# Patient Record
Sex: FEMALE | Race: BLACK OR AFRICAN AMERICAN | NOT HISPANIC OR LATINO | Employment: OTHER | URBAN - METROPOLITAN AREA
[De-identification: names, ages, dates, MRNs, and addresses within clinical notes are randomized per-mention and may not be internally consistent; named-entity substitution may affect disease eponyms.]

---

## 2018-03-19 ENCOUNTER — OFFICE VISIT (OUTPATIENT)
Dept: MEDICAL GROUP | Facility: PHYSICIAN GROUP | Age: 68
End: 2018-03-19
Payer: MEDICARE

## 2018-03-19 VITALS
OXYGEN SATURATION: 98 % | TEMPERATURE: 98.3 F | BODY MASS INDEX: 32.95 KG/M2 | DIASTOLIC BLOOD PRESSURE: 80 MMHG | SYSTOLIC BLOOD PRESSURE: 120 MMHG | WEIGHT: 205 LBS | HEIGHT: 66 IN | HEART RATE: 82 BPM | RESPIRATION RATE: 14 BRPM

## 2018-03-19 DIAGNOSIS — J45.20 MILD INTERMITTENT ASTHMA WITHOUT COMPLICATION: ICD-10-CM

## 2018-03-19 DIAGNOSIS — I10 BENIGN ESSENTIAL HTN: ICD-10-CM

## 2018-03-19 PROCEDURE — 99203 OFFICE O/P NEW LOW 30 MIN: CPT | Performed by: FAMILY MEDICINE

## 2018-03-19 RX ORDER — HYDROCHLOROTHIAZIDE 25 MG/1
25 TABLET ORAL DAILY
COMMUNITY
End: 2018-03-19 | Stop reason: SDUPTHER

## 2018-03-19 RX ORDER — MULTIVIT WITH MINERALS/LUTEIN
1 TABLET ORAL DAILY
COMMUNITY
End: 2020-06-16

## 2018-03-19 RX ORDER — HYDROCHLOROTHIAZIDE 25 MG/1
25 TABLET ORAL DAILY
Qty: 90 TAB | Refills: 1 | Status: SHIPPED | OUTPATIENT
Start: 2018-03-19 | End: 2018-09-06 | Stop reason: SDUPTHER

## 2018-03-19 ASSESSMENT — ENCOUNTER SYMPTOMS
GASTROINTESTINAL NEGATIVE: 1
PSYCHIATRIC NEGATIVE: 1
HEADACHES: 0
EYES NEGATIVE: 1
HEMOPTYSIS: 0
CHILLS: 0
RESPIRATORY NEGATIVE: 1
MUSCULOSKELETAL NEGATIVE: 1
MYALGIAS: 0
CARDIOVASCULAR NEGATIVE: 1
NECK PAIN: 0
CONSTITUTIONAL NEGATIVE: 1
NEUROLOGICAL NEGATIVE: 1
COUGH: 0
FEVER: 0
DIZZINESS: 0
CONSTIPATION: 0
PALPITATIONS: 0

## 2018-03-19 ASSESSMENT — PATIENT HEALTH QUESTIONNAIRE - PHQ9: CLINICAL INTERPRETATION OF PHQ2 SCORE: 0

## 2018-03-19 NOTE — LETTER
[facility]  Luiz Perry M.D.  [address]  [facility] [address]  Fax: [phone]   Authorization for Release/Disclosure of   Protected Health Information   Name: MICHELL TURCIOS : 1950 SSN: [id number]   Address: [address] [address] Phone:    [phone] (home)    I authorize the entity listed below to release/disclose the PHI below to:   [facility]/Luiz Perry M.D. and Luiz Perry M.D.   Provider or Entity Name:     Address   City, State, [facility]   Phone:      Fax:     Reason for request: continuity of care   Information to be released:    [  ] LAST COLONOSCOPY,  including any PATH REPORT and follow-up  [  ] LAST FIT/COLOGUARD RESULT [  ] LAST DEXA  [  ] LAST MAMMOGRAM  [  ] LAST PAP  [  ] LAST LABS [  ] RETINA EXAM REPORT  [  ] IMMUNIZATION RECORDS  [  ] Release all info      [  ] Check here and initial the line next to each item to release ALL health information INCLUDING  _____ Care and treatment for drug and / or alcohol abuse  _____ HIV testing, infection status, or AIDS  _____ Genetic Testing    DATES OF SERVICE OR TIME PERIOD TO BE DISCLOSED: _____________  I understand and acknowledge that:  * This Authorization may be revoked at any time by you in writing, except if your health information has already been used or disclosed.  * Your health information that will be used or disclosed as a result of you signing this authorization could be re-disclosed by the recipient. If this occurs, your re-disclosed health information may no longer be protected by State or Federal laws.  * You may refuse to sign this Authorization. Your refusal will not affect your ability to obtain treatment.  * This Authorization becomes effective upon signing and will  on (date) __________.      If no date is indicated, this Authorization will  one (1) year from the signature date.    Name: Michell Turcios    Signature:   Date:     3/19/2018       PLEASE FAX REQUESTED RECORDS  BACK TO: [phone]

## 2018-03-19 NOTE — PROGRESS NOTES
Subjective:      Michell Turcios is a 67 y.o. female who presents with Asthma            New patient visit, just moved here from [place]  Well controlled htn and asthma  utd on  and will bring in records for us to update  Refills done today    1. Benign essential HTN  Currently treated for HTN, taking meds with no CP or sob, monitors bp at home periodically. controlled    - hydroCHLOROthiazide (HYDRODIURIL) 25 MG Tab; Take 1 Tab by mouth every day.  Dispense: 90 Tab; Refill: 1    2. Mild intermittent asthma without complication  The patient's current medical issue is well controlled on the current therapy with no new symptoms or worsening    - Fluticasone Furoate-Vilanterol (BREO ELLIPTA) 200-25 MCG/INH AEROSOL POWDER, BREATH ACTIVATED; Inhale 1 Puff by mouth every day.  Dispense: 1 Each; Refill: 6    Past Medical History:  No date: Asthma  No date: Hypertension  Past Surgical History:  No date: ATHROPLASTY      Comment: hip r  Smoking status: Former Smoker                                                              Packs/day: 0.00      Years: 0.00         Types: Cigarettes     Quit date: 3/19/2015  Smokeless tobacco: Never Used                      Alcohol use: Yes              Comment: occ     History reviewed.  No pertinent family history.      Current Outpatient Prescriptions: •  vitamin E (VITAMIN E) 1000 UNIT Cap, Take 1 Cap by mouth every day., Disp: , Rfl: •  Fluticasone Furoate-Vilanterol (BREO ELLIPTA) 200-25 MCG/INH AEROSOL POWDER, BREATH ACTIVATED, Inhale 1 Puff by mouth every day., Disp: 1 Each, Rfl: 6•  hydroCHLOROthiazide (HYDRODIURIL) 25 MG Tab, Take 1 Tab by mouth every day., Disp: 90 Tab, Rfl: 1    Patient was instructed on the use of medications, either prescriptions or OTC and informed on when the appropriate follow up time period should be. In addition, patient was also instructed that should any acute worsening occur that they should notify this clinic asap or call 911.            Review of Systems  "  Constitutional: Negative.  Negative for chills and fever.        Past Medical History:  No date: Asthma  No date: Hypertension  Past Surgical History:  No date: ATHROPLASTY      Comment: hip r  Smoking status: Former Smoker                                                              Packs/day: 0.00      Years: 0.00         Types: Cigarettes     Quit date: 3/19/2015  Smokeless tobacco: Never Used                      Alcohol use: Yes              Comment: occ     History reviewed.  No pertinent family history.     HENT: Negative.    Eyes: Negative.    Respiratory: Negative.  Negative for cough and hemoptysis.    Cardiovascular: Negative.  Negative for chest pain and palpitations.   Gastrointestinal: Negative.  Negative for constipation.   Genitourinary: Negative.  Negative for dysuria and urgency.   Musculoskeletal: Negative.  Negative for myalgias and neck pain.   Skin: Negative.  Negative for rash.   Neurological: Negative.  Negative for dizziness and headaches.   Endo/Heme/Allergies: Negative.    Psychiatric/Behavioral: Negative.  Negative for suicidal ideas.          Objective:     /80   Pulse 82   Temp 36.8 °C (98.3 °F)   Resp 14   Ht 1.67 m (5' 5.75\")   Wt 93 kg (205 lb)   SpO2 98%   BMI 33.34 kg/m²      Physical Exam   Constitutional: She is oriented to person, place, and time. She appears well-developed and well-nourished. No distress.   HENT:   Head: Normocephalic and atraumatic.   Eyes: Pupils are equal, round, and reactive to light.   Neck: Normal range of motion. Neck supple.   Cardiovascular: Normal rate and regular rhythm.    No murmur heard.  Pulmonary/Chest: Effort normal and breath sounds normal. No respiratory distress. She has no wheezes. She has no rales. She exhibits no tenderness.   Abdominal: Soft. Bowel sounds are normal. She exhibits no distension. There is no tenderness.   Musculoskeletal: Normal range of motion.   Neurological: She is alert and oriented to person, place, and " time. She has normal reflexes. No cranial nerve deficit.   Skin: Skin is warm and dry. She is not diaphoretic.   Psychiatric: She has a normal mood and affect. Her behavior is normal. Judgment and thought content normal.   Nursing note and vitals reviewed.              Assessment/Plan:     1. Benign essential HTN    - hydroCHLOROthiazide (HYDRODIURIL) 25 MG Tab; Take 1 Tab by mouth every day.  Dispense: 90 Tab; Refill: 1    2. Mild intermittent asthma without complication    - Fluticasone Furoate-Vilanterol (BREO ELLIPTA) 200-25 MCG/INH AEROSOL POWDER, BREATH ACTIVATED; Inhale 1 Puff by mouth every day.  Dispense: 1 Each; Refill: 6

## 2018-05-09 ENCOUNTER — TELEPHONE (OUTPATIENT)
Dept: MEDICAL GROUP | Facility: PHYSICIAN GROUP | Age: 68
End: 2018-05-09

## 2018-05-09 NOTE — TELEPHONE ENCOUNTER
Patient called left a message regarding some papers that need to be signed I was not sure about the message I called patient back no answer so I let a message asking for her to call us back to clarify what she is needing.

## 2018-05-23 ENCOUNTER — OFFICE VISIT (OUTPATIENT)
Dept: MEDICAL GROUP | Facility: PHYSICIAN GROUP | Age: 68
End: 2018-05-23
Payer: MEDICARE

## 2018-05-23 VITALS
RESPIRATION RATE: 12 BRPM | HEIGHT: 65 IN | DIASTOLIC BLOOD PRESSURE: 60 MMHG | TEMPERATURE: 97.6 F | HEART RATE: 62 BPM | WEIGHT: 213 LBS | OXYGEN SATURATION: 99 % | SYSTOLIC BLOOD PRESSURE: 122 MMHG | BODY MASS INDEX: 35.49 KG/M2

## 2018-05-23 DIAGNOSIS — Z02.71 DISABILITY EXAMINATION: ICD-10-CM

## 2018-05-23 PROCEDURE — 7101 PR PHYSICAL: Performed by: FAMILY MEDICINE

## 2018-05-23 NOTE — PROGRESS NOTES
Over 50% of this 25 minute visit was spent on counseling and coordination of care for the problem of  1. Disability examination  Forms filled out for rtc see in media    Past Medical History:   Diagnosis Date   • Asthma    • Hypertension      Past Surgical History:   Procedure Laterality Date   • ATHROPLASTY      hip r     Social History   Substance Use Topics   • Smoking status: Former Smoker     Types: Cigarettes     Quit date: 3/19/2015   • Smokeless tobacco: Never Used   • Alcohol use Yes      Comment: occ      History reviewed. No pertinent family history.      Current Outpatient Prescriptions:   •  ipratropium-albuterol (COMBIVENT RESPIMAT)  MCG/ACT Aero Soln, Inhale 1 Puff by mouth 4 times a day., Disp: , Rfl:   •  Fluticasone Furoate-Vilanterol (BREO ELLIPTA) 200-25 MCG/INH AEROSOL POWDER, BREATH ACTIVATED, Inhale 1 Puff by mouth every day., Disp: 1 Each, Rfl: 6  •  hydroCHLOROthiazide (HYDRODIURIL) 25 MG Tab, Take 1 Tab by mouth every day., Disp: 90 Tab, Rfl: 1  •  vitamin E (VITAMIN E) 1000 UNIT Cap, Take 1 Cap by mouth every day., Disp: , Rfl:     Patient was instructed on the use of medications, either prescriptions or OTC and informed on when the appropriate follow up time period should be. In addition, patient was also instructed that should any acute worsening occur that they should notify this clinic asap or call 911.

## 2018-06-18 ENCOUNTER — OFFICE VISIT (OUTPATIENT)
Dept: MEDICAL GROUP | Facility: PHYSICIAN GROUP | Age: 68
End: 2018-06-18
Payer: MEDICARE

## 2018-06-18 VITALS
HEART RATE: 70 BPM | DIASTOLIC BLOOD PRESSURE: 70 MMHG | WEIGHT: 212 LBS | SYSTOLIC BLOOD PRESSURE: 130 MMHG | TEMPERATURE: 98.3 F | BODY MASS INDEX: 35.32 KG/M2 | HEIGHT: 65 IN | OXYGEN SATURATION: 96 % | RESPIRATION RATE: 14 BRPM

## 2018-06-18 DIAGNOSIS — I10 BENIGN ESSENTIAL HTN: ICD-10-CM

## 2018-06-18 DIAGNOSIS — J30.1 ACUTE SEASONAL ALLERGIC RHINITIS DUE TO POLLEN: ICD-10-CM

## 2018-06-18 DIAGNOSIS — Z12.31 ENCOUNTER FOR SCREENING MAMMOGRAM FOR HIGH-RISK PATIENT: ICD-10-CM

## 2018-06-18 DIAGNOSIS — Z78.0 POST-MENOPAUSAL: ICD-10-CM

## 2018-06-18 DIAGNOSIS — Z00.00 WELL ADULT EXAM: ICD-10-CM

## 2018-06-18 DIAGNOSIS — E66.9 OBESITY (BMI 30-39.9): ICD-10-CM

## 2018-06-18 PROCEDURE — 99214 OFFICE O/P EST MOD 30 MIN: CPT | Performed by: FAMILY MEDICINE

## 2018-06-18 RX ORDER — TRIAMCINOLONE ACETONIDE 40 MG/ML
40 INJECTION, SUSPENSION INTRA-ARTICULAR; INTRAMUSCULAR ONCE
Status: COMPLETED | OUTPATIENT
Start: 2018-06-18 | End: 2018-06-18

## 2018-06-18 RX ORDER — CHLORAL HYDRATE 500 MG
1000 CAPSULE ORAL
COMMUNITY
End: 2020-09-22

## 2018-06-18 RX ADMIN — TRIAMCINOLONE ACETONIDE 40 MG: 40 INJECTION, SUSPENSION INTRA-ARTICULAR; INTRAMUSCULAR at 08:36

## 2018-06-18 ASSESSMENT — ENCOUNTER SYMPTOMS
FEVER: 0
GASTROINTESTINAL NEGATIVE: 1
NECK PAIN: 0
MYALGIAS: 0
PALPITATIONS: 0
NEUROLOGICAL NEGATIVE: 1
DIZZINESS: 0
HEMOPTYSIS: 0
CONSTITUTIONAL NEGATIVE: 1
WHEEZING: 1
CARDIOVASCULAR NEGATIVE: 1
HEADACHES: 0
CONSTIPATION: 0
CHILLS: 0
COUGH: 0
EYES NEGATIVE: 1
PSYCHIATRIC NEGATIVE: 1
MUSCULOSKELETAL NEGATIVE: 1

## 2018-06-18 NOTE — PROGRESS NOTES
Subjective:      Michell Moon is a 68 y.o. female who presents with Hypertension            1. Benign essential HTN  Currently treated for HTN, taking meds with no CP or sob, monitors bp at home periodically. controlled      2. Obesity (BMI 30-39.9)    - Patient identified as having weight management issue.  Appropriate orders and counseling given.    3. Post-menopausal    - DS-BONE DENSITY STUDY (DEXA)    4. Encounter for screening mammogram for high-risk patient    - MA-SCREEN MAMMO W/CAD-BILAT; Future    5. Well adult exam    - OCCULT BLOOD FECES IMMUNOASSAY (FIT); Future    6. Acute seasonal allergic rhinitis due to pollen  Stuffy nose and occasional wheeze at times, using breo but worse since allergy season started  - triamcinolone acetonide (KENALOG-40) injection 40 mg; 1 mL by Intramuscular route Once.    Past Medical History:  No date: Asthma  No date: Hypertension  Past Surgical History:  No date: ATHROPLASTY      Comment: hip r  Smoking status: Former Smoker                                                              Packs/day: 0.00      Years: 0.00         Types: Cigarettes     Quit date: 3/19/2015  Smokeless tobacco: Never Used                      Alcohol use: Yes              Comment: occ     No family history on file.      Current Outpatient Prescriptions: •  Omega-3 Fatty Acids (FISH OIL) 1000 MG Cap capsule, Take 1,000 mg by mouth 3 times a day, with meals., Disp: , Rfl: •  Garlic 10 MG Cap, Take  by mouth., Disp: , Rfl: •  ipratropium-albuterol (COMBIVENT RESPIMAT)  MCG/ACT Aero Soln, Inhale 1 Puff by mouth 4 times a day., Disp: , Rfl: •  vitamin E (VITAMIN E) 1000 UNIT Cap, Take 1 Cap by mouth every day., Disp: , Rfl: •  Fluticasone Furoate-Vilanterol (BREO ELLIPTA) 200-25 MCG/INH AEROSOL POWDER, BREATH ACTIVATED, Inhale 1 Puff by mouth every day., Disp: 1 Each, Rfl: 6•  hydroCHLOROthiazide (HYDRODIURIL) 25 MG Tab, Take 1 Tab by mouth every day., Disp: 90 Tab, Rfl: 1  Current  "Facility-Administered Medications: •  triamcinolone acetonide (KENALOG-40) injection 40 mg, 40 mg, Intramuscular, Once, Luiz Perry M.D.    Patient was instructed on the use of medications, either prescriptions or OTC and informed on when the appropriate follow up time period should be. In addition, patient was also instructed that should any acute worsening occur that they should notify this clinic asap or call 911.            Review of Systems   Constitutional: Negative.  Negative for chills and fever.        Past Medical History:  No date: Asthma  No date: Hypertension  Past Surgical History:  No date: ATHROPLASTY      Comment: hip r  Smoking status: Former Smoker                                                              Packs/day: 0.00      Years: 0.00         Types: Cigarettes     Quit date: 3/19/2015  Smokeless tobacco: Never Used                      Alcohol use: Yes              Comment: occ     No family history on file.     HENT: Positive for congestion.    Eyes: Negative.    Respiratory: Positive for wheezing. Negative for cough and hemoptysis.    Cardiovascular: Negative.  Negative for chest pain and palpitations.   Gastrointestinal: Negative.  Negative for constipation.   Genitourinary: Negative.  Negative for dysuria and urgency.   Musculoskeletal: Negative.  Negative for myalgias and neck pain.   Skin: Negative.  Negative for rash.   Neurological: Negative.  Negative for dizziness and headaches.   Endo/Heme/Allergies: Negative.    Psychiatric/Behavioral: Negative.  Negative for suicidal ideas.          Objective:     /70   Pulse 70   Temp 36.8 °C (98.3 °F)   Resp 14   Ht 1.651 m (5' 5\")   Wt 96.2 kg (212 lb)   SpO2 96%   BMI 35.28 kg/m²      Physical Exam   Constitutional: She is oriented to person, place, and time. She appears well-developed and well-nourished. No distress.   HENT:   Head: Normocephalic and atraumatic.   Mouth/Throat: Oropharynx is clear and moist. No " oropharyngeal exudate.   Eyes: Pupils are equal, round, and reactive to light.   Cardiovascular: Normal rate, regular rhythm, normal heart sounds and intact distal pulses.  Exam reveals no gallop and no friction rub.    No murmur heard.  Pulmonary/Chest: Effort normal and breath sounds normal. No respiratory distress. She has no wheezes. She has no rales. She exhibits no tenderness.   Neurological: She is alert and oriented to person, place, and time.   Skin: She is not diaphoretic.   Psychiatric: She has a normal mood and affect. Her behavior is normal. Judgment and thought content normal.   Nursing note and vitals reviewed.              Assessment/Plan:     1. Benign essential HTN      2. Obesity (BMI 30-39.9)    - Patient identified as having weight management issue.  Appropriate orders and counseling given.    3. Post-menopausal    - DS-BONE DENSITY STUDY (DEXA)    4. Encounter for screening mammogram for high-risk patient    - MA-SCREEN MAMMO W/CAD-BILAT; Future    5. Well adult exam    - OCCULT BLOOD FECES IMMUNOASSAY (FIT); Future    6. Acute seasonal allergic rhinitis due to pollen    - triamcinolone acetonide (KENALOG-40) injection 40 mg; 1 mL by Intramuscular route Once.

## 2018-07-18 ENCOUNTER — HOSPITAL ENCOUNTER (OUTPATIENT)
Dept: RADIOLOGY | Facility: MEDICAL CENTER | Age: 68
End: 2018-07-18
Attending: FAMILY MEDICINE
Payer: MEDICARE

## 2018-07-18 DIAGNOSIS — Z12.31 ENCOUNTER FOR SCREENING MAMMOGRAM FOR HIGH-RISK PATIENT: ICD-10-CM

## 2018-07-18 PROCEDURE — 77080 DXA BONE DENSITY AXIAL: CPT

## 2018-07-18 PROCEDURE — 77067 SCR MAMMO BI INCL CAD: CPT

## 2018-07-19 ENCOUNTER — TELEPHONE (OUTPATIENT)
Dept: MEDICAL GROUP | Facility: PHYSICIAN GROUP | Age: 68
End: 2018-07-19

## 2018-08-09 ENCOUNTER — OFFICE VISIT (OUTPATIENT)
Dept: URGENT CARE | Facility: CLINIC | Age: 68
End: 2018-08-09
Payer: MEDICARE

## 2018-08-09 VITALS
SYSTOLIC BLOOD PRESSURE: 138 MMHG | BODY MASS INDEX: 34.22 KG/M2 | OXYGEN SATURATION: 98 % | WEIGHT: 205.4 LBS | TEMPERATURE: 97.4 F | HEIGHT: 65 IN | HEART RATE: 100 BPM | DIASTOLIC BLOOD PRESSURE: 90 MMHG | RESPIRATION RATE: 18 BRPM

## 2018-08-09 DIAGNOSIS — J45.21 MILD INTERMITTENT ASTHMA WITH EXACERBATION: ICD-10-CM

## 2018-08-09 DIAGNOSIS — J42 CHRONIC BRONCHITIS, UNSPECIFIED CHRONIC BRONCHITIS TYPE (HCC): ICD-10-CM

## 2018-08-09 DIAGNOSIS — R06.03 RESPIRATORY DISTRESS: ICD-10-CM

## 2018-08-09 DIAGNOSIS — I10 BENIGN ESSENTIAL HTN: ICD-10-CM

## 2018-08-09 PROCEDURE — 94640 AIRWAY INHALATION TREATMENT: CPT | Performed by: FAMILY MEDICINE

## 2018-08-09 PROCEDURE — 99214 OFFICE O/P EST MOD 30 MIN: CPT | Mod: 25 | Performed by: FAMILY MEDICINE

## 2018-08-09 RX ORDER — METHYLPREDNISOLONE SODIUM SUCCINATE 125 MG/2ML
125 INJECTION, POWDER, LYOPHILIZED, FOR SOLUTION INTRAMUSCULAR; INTRAVENOUS ONCE
Status: COMPLETED | OUTPATIENT
Start: 2018-08-09 | End: 2018-08-09

## 2018-08-09 RX ORDER — METHYLPREDNISOLONE 4 MG/1
TABLET ORAL
Qty: 21 TAB | Refills: 0 | Status: SHIPPED | OUTPATIENT
Start: 2018-08-10 | End: 2018-10-29

## 2018-08-09 RX ORDER — ALBUTEROL SULFATE 90 UG/1
1-2 AEROSOL, METERED RESPIRATORY (INHALATION) EVERY 6 HOURS PRN
Qty: 1 INHALER | Refills: 0 | Status: SHIPPED | OUTPATIENT
Start: 2018-08-09 | End: 2018-10-29

## 2018-08-09 RX ORDER — IPRATROPIUM BROMIDE AND ALBUTEROL SULFATE 2.5; .5 MG/3ML; MG/3ML
3 SOLUTION RESPIRATORY (INHALATION) EVERY 6 HOURS PRN
Qty: 30 BULLET | Refills: 0 | Status: SHIPPED | OUTPATIENT
Start: 2018-08-09 | End: 2018-10-29

## 2018-08-09 RX ORDER — IPRATROPIUM BROMIDE AND ALBUTEROL SULFATE 2.5; .5 MG/3ML; MG/3ML
3 SOLUTION RESPIRATORY (INHALATION) ONCE
Status: COMPLETED | OUTPATIENT
Start: 2018-08-09 | End: 2018-08-09

## 2018-08-09 RX ADMIN — METHYLPREDNISOLONE SODIUM SUCCINATE 125 MG: 125 INJECTION, POWDER, LYOPHILIZED, FOR SOLUTION INTRAMUSCULAR; INTRAVENOUS at 18:00

## 2018-08-09 RX ADMIN — IPRATROPIUM BROMIDE AND ALBUTEROL SULFATE 3 ML: 2.5; .5 SOLUTION RESPIRATORY (INHALATION) at 18:00

## 2018-08-10 NOTE — PROGRESS NOTES
Subjective:      Michell Moon is a 68 y.o. female who presents with Asthma (cough, sob, sweats and chills, today has been the worse )    Patient presents to  with her daughter in respiratory distress. This is a new problmem onset over past 36hrs. She is speaking in 1-2 words audibly wheezing and choking on mucus as she coughs. Immediately given a duoneb tx and solumedrol 125mg.     Within several minutes of the treatment she is feeling much improved. She states that she has a h/o ashtma, daughter adds copd as well. She has recenlty been started on a med for COPD inhaled but with the smoke in the air she has had progressive problems with breathing. Denies any fevers has had chills today. She denies chest pain palpitations feeling lightheaded or faint. She denies feelilng ill recently no discolored mucus when she coughs, no nasal congestion or sinus pain no ear pain or sore throat no n,vd\\        HPI  Review of Systems   Constitutional: Negative for malaise/fatigue.   Eyes: Negative for discharge.   Respiratory: Positive for stridor. Negative for hemoptysis.    Cardiovascular: Negative for palpitations.   Musculoskeletal: Negative.    Skin: Negative for rash.   Neurological: Positive for headaches. Negative for weakness.   All other systems reviewed and are negative.    PMH:  has a past medical history of Asthma and Hypertension.  MEDS:   Current Outpatient Prescriptions:   •  Hydrocod Polst-CPM Polst ER (TUSSIONEX) 10-8 MG/5ML Suspension Extended Release, Take 5 mL by mouth 2 times a day as needed for Cough or Rhinitis for up to 6 days. Will cause sedation, avoid driving, operating heavy machinery, and drinking alcohol, Disp: 60 mL, Rfl: 0  •  ipratropium-albuterol (DUONEB) 0.5-2.5 (3) MG/3ML nebulizer solution, 3 mL by Nebulization route every 6 hours as needed for Shortness of Breath., Disp: 30 Bullet, Rfl: 0  •  [START ON 8/10/2018] MethylPREDNISolone (MEDROL DOSEPAK) 4 MG Tablet Therapy Pack, Take as directed  "with food, Disp: 21 Tab, Rfl: 0  •  albuterol 108 (90 Base) MCG/ACT Aero Soln inhalation aerosol, Inhale 1-2 Puffs by mouth every 6 hours as needed for Shortness of Breath., Disp: 1 Inhaler, Rfl: 0  •  Omega-3 Fatty Acids (FISH OIL) 1000 MG Cap capsule, Take 1,000 mg by mouth 3 times a day, with meals., Disp: , Rfl:   •  Garlic 10 MG Cap, Take  by mouth., Disp: , Rfl:   •  ipratropium-albuterol (COMBIVENT RESPIMAT)  MCG/ACT Aero Soln, Inhale 1 Puff by mouth 4 times a day., Disp: , Rfl:   •  vitamin E (VITAMIN E) 1000 UNIT Cap, Take 1 Cap by mouth every day., Disp: , Rfl:   •  Fluticasone Furoate-Vilanterol (BREO ELLIPTA) 200-25 MCG/INH AEROSOL POWDER, BREATH ACTIVATED, Inhale 1 Puff by mouth every day., Disp: 1 Each, Rfl: 6  •  hydroCHLOROthiazide (HYDRODIURIL) 25 MG Tab, Take 1 Tab by mouth every day., Disp: 90 Tab, Rfl: 1    Current Facility-Administered Medications:   •  ipratropium-albuterol (DUONEB) nebulizer solution, 3 mL, Nebulization, Once, Laureen Armenta D.O.  •  methylPREDNISolone sod succ (SOLU-MEDROL) 125 MG injection 125 mg, 125 mg, Intramuscular, Once, Laureen Armenta D.O.  ALLERGIES:   Allergies   Allergen Reactions   • Atorvastatin Vomiting   SURGHX:   Past Surgical History:   Procedure Laterality Date   • ATHROPLASTY      hip r   SOCHX:  reports that she quit smoking about 3 years ago. Her smoking use included Cigarettes. She has never used smokeless tobacco. She reports that she drinks alcohol. She reports that she does not use drugs.  FH: Family history was reviewed, no pertinent findings to report     Objective:     /90   Pulse 100   Temp 36.3 °C (97.4 °F)   Resp 18   Ht 1.651 m (5' 5\")   Wt 93.2 kg (205 lb 6.4 oz)   SpO2 98%   Breastfeeding? No   BMI 34.18 kg/m²      Physical Exam   Constitutional: She is oriented to person, place, and time. She appears well-developed and well-nourished. She appears distressed.   HENT:   Mouth/Throat: Oropharynx is clear and moist. "   Eyes: Conjunctivae are normal.   Neck: Normal range of motion.   Cardiovascular: Normal rate, regular rhythm, normal heart sounds and intact distal pulses.  Exam reveals no gallop and no friction rub.    No murmur heard.  Pulmonary/Chest: She is in respiratory distress. She has wheezes. She has rales. She exhibits no tenderness.   Abdominal: Soft.   Musculoskeletal: Normal range of motion.   Lymphadenopathy:     She has no cervical adenopathy.   Neurological: She is alert and oriented to person, place, and time.   Skin: Skin is warm and dry. She is not diaphoretic.       Therapeutics: soumedrol 125mg IM, duoneb after which pt much better, air excursion improved, she felt better, pulse ox stayed stable    Assessment/Plan:     1. Respiratory distress  ipratropium-albuterol (DUONEB) nebulizer solution    methylPREDNISolone sod succ (SOLU-MEDROL) 125 MG injection 125 mg    Hydrocod Polst-CPM Polst ER (TUSSIONEX) 10-8 MG/5ML Suspension Extended Release    ipratropium-albuterol (DUONEB) 0.5-2.5 (3) MG/3ML nebulizer solution    MethylPREDNISolone (MEDROL DOSEPAK) 4 MG Tablet Therapy Pack    albuterol 108 (90 Base) MCG/ACT Aero Soln inhalation aerosol    REFERRAL TO PULMONOLOGY   2. Benign essential HTN     3. Mild intermittent asthma with exacerbation  ipratropium-albuterol (DUONEB) nebulizer solution    methylPREDNISolone sod succ (SOLU-MEDROL) 125 MG injection 125 mg    Hydrocod Polst-CPM Polst ER (TUSSIONEX) 10-8 MG/5ML Suspension Extended Release    ipratropium-albuterol (DUONEB) 0.5-2.5 (3) MG/3ML nebulizer solution    MethylPREDNISolone (MEDROL DOSEPAK) 4 MG Tablet Therapy Pack    albuterol 108 (90 Base) MCG/ACT Aero Soln inhalation aerosol    REFERRAL TO PULMONOLOGY   4. Chronic bronchitis, unspecified chronic bronchitis type (HCC)  ipratropium-albuterol (DUONEB) 0.5-2.5 (3) MG/3ML nebulizer solution    MethylPREDNISolone (MEDROL DOSEPAK) 4 MG Tablet Therapy Pack    albuterol 108 (90 Base) MCG/ACT Aero Soln  inhalation aerosol    REFERRAL TO PULMONOLOGY     Chronic conditions of hypertension that may potentially impact the patient's ability to recover from this condition appear fair. The patient will f/u with their pcp and continue with current chronic medications as directed.    Differential diagnosis, natural history, supportive care discussed. Follow-up with primary care provider within 7-10 days, emergency room precautions discussed.  Patient and/or family appears understanding of information.

## 2018-08-11 ASSESSMENT — ENCOUNTER SYMPTOMS
HEADACHES: 1
MUSCULOSKELETAL NEGATIVE: 1
EYE DISCHARGE: 0
PALPITATIONS: 0
HEMOPTYSIS: 0
STRIDOR: 1
WEAKNESS: 0

## 2018-08-13 ENCOUNTER — OFFICE VISIT (OUTPATIENT)
Dept: PULMONOLOGY | Facility: HOSPICE | Age: 68
End: 2018-08-13
Payer: MEDICARE

## 2018-08-13 VITALS
DIASTOLIC BLOOD PRESSURE: 76 MMHG | RESPIRATION RATE: 16 BRPM | BODY MASS INDEX: 34.89 KG/M2 | OXYGEN SATURATION: 95 % | WEIGHT: 204.4 LBS | HEIGHT: 64 IN | SYSTOLIC BLOOD PRESSURE: 122 MMHG | HEART RATE: 87 BPM | TEMPERATURE: 97.2 F

## 2018-08-13 DIAGNOSIS — J45.41 MODERATE PERSISTENT ASTHMA WITH ACUTE EXACERBATION: ICD-10-CM

## 2018-08-13 PROCEDURE — 99204 OFFICE O/P NEW MOD 45 MIN: CPT | Performed by: INTERNAL MEDICINE

## 2018-08-13 RX ORDER — TRIAMCINOLONE ACETONIDE 40 MG/ML
40 INJECTION, SUSPENSION INTRA-ARTICULAR; INTRAMUSCULAR ONCE
Status: COMPLETED | OUTPATIENT
Start: 2018-08-13 | End: 2018-08-13

## 2018-08-13 RX ORDER — ALBUTEROL SULFATE 90 UG/1
2 AEROSOL, METERED RESPIRATORY (INHALATION) EVERY 6 HOURS PRN
COMMUNITY
End: 2018-10-29

## 2018-08-13 RX ORDER — CETIRIZINE HYDROCHLORIDE 10 MG/1
10 TABLET ORAL DAILY
COMMUNITY
End: 2020-09-22

## 2018-08-13 RX ADMIN — TRIAMCINOLONE ACETONIDE 40 MG: 40 INJECTION, SUSPENSION INTRA-ARTICULAR; INTRAMUSCULAR at 09:37

## 2018-08-13 NOTE — PROGRESS NOTES
"Chief Complaint   Patient presents with   • Establish Care     Referred by Laureen Armenta DO for Respiratory distress,Chronic Bronchitis       HPI: This patient is a 68 y.o. Female who is referred for asthma.  She is diagnosed with asthma in childhood, had followed [place] pulmonology [place] the past, [place] state.  She moved to [place] from [facility] in November 2017.  She had an asthma exacerbation in March 2018 which she attributed to an inversion layer.  Over the past week she has had exacerbation secondary to environmental smoke.  She was seen in urgent care on August 9, 2018 and provided steroids and discharged on Medrol dose pack. She is compliant with Breo 200ug QD.  She has been requiring her THANG multiple times daily.  She acquired a new cat in March 2018 although states she has always had cats in the household.  She is a non-smoker, had smoked socially only in the past.  Denies secondhand smoke exposures.  Her asthma triggers are principally seasonal allergies, URIs or \"change in seasons\".      Past Medical History:   Diagnosis Date   • Asthma    • Bronchitis    • Chickenpox    • COPD (chronic obstructive pulmonary disease) (HCC)    • Hypertension    • Mumps    • Osteoporosis    • Pulmonary emphysema (HCC)        Social History     Social History   • Marital status: Single     Spouse name: N/A   • Number of children: N/A   • Years of education: N/A     Occupational History   • Not on file.     Social History Main Topics   • Smoking status: Former Smoker     Packs/day: 0.25     Years: 4.00     Types: Cigarettes     Quit date: 3/1/2011   • Smokeless tobacco: Never Used   • Alcohol use Yes      Comment: occasional   • Drug use: No   • Sexual activity: Not Currently     Partners: Male     Other Topics Concern   • Not on file     Social History Narrative   • No narrative on file       Family History   Problem Relation Age of Onset   • Prostate cancer Father        Current Outpatient Prescriptions on File " Prior to Visit   Medication Sig Dispense Refill   • Hydrocod Polst-CPM Polst ER (TUSSIONEX) 10-8 MG/5ML Suspension Extended Release Take 5 mL by mouth 2 times a day as needed for Cough or Rhinitis for up to 6 days. Will cause sedation, avoid driving, operating heavy machinery, and drinking alcohol 60 mL 0   • ipratropium-albuterol (DUONEB) 0.5-2.5 (3) MG/3ML nebulizer solution 3 mL by Nebulization route every 6 hours as needed for Shortness of Breath. 30 Bullet 0   • Omega-3 Fatty Acids (FISH OIL) 1000 MG Cap capsule Take 1,000 mg by mouth 3 times a day, with meals.     • Garlic 10 MG Cap Take  by mouth.     • ipratropium-albuterol (COMBIVENT RESPIMAT)  MCG/ACT Aero Soln Inhale 1 Puff by mouth 4 times a day.     • vitamin E (VITAMIN E) 1000 UNIT Cap Take 1 Cap by mouth every day.     • Fluticasone Furoate-Vilanterol (BREO ELLIPTA) 200-25 MCG/INH AEROSOL POWDER, BREATH ACTIVATED Inhale 1 Puff by mouth every day. 1 Each 6   • hydroCHLOROthiazide (HYDRODIURIL) 25 MG Tab Take 1 Tab by mouth every day. 90 Tab 1   • MethylPREDNISolone (MEDROL DOSEPAK) 4 MG Tablet Therapy Pack Take as directed with food 21 Tab 0   • albuterol 108 (90 Base) MCG/ACT Aero Soln inhalation aerosol Inhale 1-2 Puffs by mouth every 6 hours as needed for Shortness of Breath. 1 Inhaler 0     No current facility-administered medications on file prior to visit.        Allergies: Pollen extract and Atorvastatin    ROS:   Constitutional: Denies fevers, chills, night sweats, fatigue or weight loss  Eyes: Denies vision loss, pain, drainage, double vision  Ears, Nose, Throat: Denies earache, difficulty hearing, tinnitus, nasal congestion, hoarseness  Cardiovascular: Denies chest pain, tightness, palpitations, orthopnea or edema  Respiratory: +shortness of breath, cough, wheezing, denies hemoptysis  Sleep: Denies daytime sleepiness, snoring, apneas, insomnia, morning headaches  GI: Denies heartburn, dysphagia, nausea, abdominal pain, diarrhea or  "constipation  : Denies frequent urination, hematuria, discharge or painful urination  Musculoskeletal: Denies back pain, painful joints, sore muscles  Neurological: Denies weakness or headaches  Skin: No rashes    Blood pressure 122/76, pulse 87, temperature 36.2 °C (97.2 °F), resp. rate 16, height 1.626 m (5' 4\"), weight 92.7 kg (204 lb 6.4 oz), SpO2 95 %.    Physical Exam:  Appearance: Well-nourished, well-developed, in no acute distress  HEENT: Normocephalic, atraumatic, white sclera, PERRLA, oropharynx clear  Neck: No adenopathy or masses  Respiratory: no intercostal retractions or accessory muscle use  Lungs auscultation: Diminished breath sounds, diffuse wheezing  Cardiovascular: Regular rate rhythm. No murmurs, rubs or gallops.  No LE edema  Abdomen: soft, nondistended  Gait: Normal  Digits: No clubbing, cyanosis  Motor: No focal deficits  Orientation: Oriented to time, person and place    Diagnosis:  1. Moderate persistent asthma with acute exacerbation  triamcinolone acetonide (KENALOG-40) injection 40 mg    ALLERGY ZONE 15    IMMUNOGLOBULIN E, TOTAL    EOSINOPHIL COUNT    AMB PULMONARY FUNCTION TEST/LAB    DX-CHEST-2 VIEWS    Tiotropium Bromide Monohydrate (SPIRIVA RESPIMAT) 2.5 MCG/ACT Aero Soln       Plan:  The patient's asthma has exacerbated secondary to environmental smoke.  Kenalog 40 mg IM provided.  She is currently on a Medrol dose pack.  Start Spiriva Respimat 2.5ug 2 puffs QD.  Continue Breo 200ug 1 puff QD, and albuterol HFA 1-2 puffs every 4 hours as needed.  Obtain RAST (particularly to exclude cat allergy), IgE level and eosinophil count.  Obtain baseline pulmonary function testing with DLCO.  Obtain baseline two-view chest x-ray.  Return in about 4 weeks (around 9/10/2018).      "

## 2018-08-13 NOTE — LETTER
August 13, 2018      To whom it may concern,    Re: Michell Moon      Due to environmental circumstances, Mrs. Moon is experiencing a severe asthma exacerbation and is unable to work.  Please exempt her from work until August 27, 2018.        Please contact me with any questions or concerns.                Sincerely,          Joanne Lewis

## 2018-08-14 ENCOUNTER — HOSPITAL ENCOUNTER (OUTPATIENT)
Dept: LAB | Facility: MEDICAL CENTER | Age: 68
End: 2018-08-14
Attending: INTERNAL MEDICINE
Payer: MEDICARE

## 2018-08-14 LAB — EOSINOPHIL # BLD AUTO: 0.19 K/UL (ref 0–0.51)

## 2018-08-14 PROCEDURE — 36415 COLL VENOUS BLD VENIPUNCTURE: CPT

## 2018-08-14 PROCEDURE — 85004 AUTOMATED DIFF WBC COUNT: CPT

## 2018-08-14 PROCEDURE — 86003 ALLG SPEC IGE CRUDE XTRC EA: CPT | Mod: 91

## 2018-08-14 PROCEDURE — 82785 ASSAY OF IGE: CPT

## 2018-08-16 LAB
A ALTERNATA IGE QN: 5.43 KU/L
A FUMIGATUS IGE QN: 1.67 KU/L
BERMUDA GRASS IGE QN: <0.1 KU/L
BOXELDER IGE QN: 0.18 KU/L
C SPHAEROSPERMUM IGE QN: <0.1 KU/L
CAT DANDER IGE QN: 2.72 KU/L
CMN PIGWEED IGE QN: 0.29 KU/L
COMMON RAGWEED IGE QN: 0.11 KU/L
COTTONWOOD IGE QN: <0.1 KU/L
COW MILK IGE QN: 0.33 KU/L
D FARINAE IGE QN: 0.15 KU/L
D PTERONYSS IGE QN: 0.14 KU/L
DEPRECATED MISC ALLERGEN IGE RAST QL: ABNORMAL
DOG DANDER IGE QN: 0.4 KU/L
IGE SERPL-ACNC: 1658 KU/L
M RACEMOSUS IGE QN: <0.1 KU/L
MOUSE EPITH IGE QN: 0.11 KU/L
MT JUNIPER IGE QN: 0.14 KU/L
MUGWORT IGE QN: <0.1 KU/L
OLIVE POLN IGE QN: <0.1 KU/L
P NOTATUM IGE QN: <0.1 KU/L
PEANUT IGE QN: <0.1 KU/L
ROACH IGE QN: 0.12 KU/L
SALTWORT IGE QN: <0.1 KU/L
TIMOTHY IGE QN: 0.18 KU/L
WHITE ELM IGE QN: 0.25 KU/L
WHITE MULBERRY IGE QN: <0.1 KU/L
WHITE OAK IGE QN: <0.1 KU/L

## 2018-08-20 ENCOUNTER — PATIENT MESSAGE (OUTPATIENT)
Dept: MEDICAL GROUP | Facility: PHYSICIAN GROUP | Age: 68
End: 2018-08-20

## 2018-08-20 ENCOUNTER — TELEPHONE (OUTPATIENT)
Dept: MEDICAL GROUP | Facility: PHYSICIAN GROUP | Age: 68
End: 2018-08-20

## 2018-08-20 NOTE — TELEPHONE ENCOUNTER
----- Message from Michell Moon sent at 8/20/2018 11:03 AM PDT -----  Regarding: Non-Urgent Medical Question  Contact: [phone]  May my daughter Johnathon Carmona  the kit you are requesting for colorectal test again. I followed all steps as requested.      Thank you,    Michell Moon

## 2018-08-21 NOTE — TELEPHONE ENCOUNTER
----- Message from Michell Moon sent at 8/20/2018 11:03 AM PDT -----  Regarding: Non-Urgent Medical Question  Contact: [phone]  May my daughter Johnathon Carmona  the kit you are requesting for colorectal test again. I followed all steps as requested.      Thank you,    Michell Moon

## 2018-08-21 NOTE — TELEPHONE ENCOUNTER
----- Message from Michell Moon sent at 8/20/2018 11:03 AM PDT -----  Regarding: Non-Urgent Medical Question  Contact: [phone]  May my daughter Johnathon Carmona  the kit you are requesting for colorectal test again. I followed all steps as requested.      Thank you,    Michell Moon

## 2018-08-27 ENCOUNTER — HOSPITAL ENCOUNTER (OUTPATIENT)
Facility: MEDICAL CENTER | Age: 68
End: 2018-08-27
Attending: FAMILY MEDICINE
Payer: MEDICARE

## 2018-08-27 PROCEDURE — 82274 ASSAY TEST FOR BLOOD FECAL: CPT | Mod: GY

## 2018-08-31 DIAGNOSIS — Z00.00 WELL ADULT EXAM: ICD-10-CM

## 2018-08-31 LAB — AMBIGUOUS DTTM AMBI4: NORMAL

## 2018-09-01 LAB — HEMOCCULT STL QL IA: NEGATIVE

## 2018-09-06 DIAGNOSIS — I10 BENIGN ESSENTIAL HTN: ICD-10-CM

## 2018-09-06 DIAGNOSIS — J45.41 MODERATE PERSISTENT ASTHMA WITH ACUTE EXACERBATION: ICD-10-CM

## 2018-09-06 RX ORDER — HYDROCHLOROTHIAZIDE 25 MG/1
25 TABLET ORAL DAILY
Qty: 90 TAB | Refills: 1 | Status: SHIPPED | OUTPATIENT
Start: 2018-09-06 | End: 2018-09-12 | Stop reason: SDUPTHER

## 2018-09-06 NOTE — TELEPHONE ENCOUNTER
Was the patient seen in the last year in this department? Yes    Does patient have an active prescription for medications requested? Yes    Received Request Via: Pharmacy     Last visit 6/18/2018  Last labs 8/14/2018

## 2018-09-10 ENCOUNTER — NON-PROVIDER VISIT (OUTPATIENT)
Dept: PULMONOLOGY | Facility: HOSPICE | Age: 68
End: 2018-09-10
Payer: MEDICARE

## 2018-09-10 ENCOUNTER — APPOINTMENT (OUTPATIENT)
Dept: RADIOLOGY | Facility: IMAGING CENTER | Age: 68
End: 2018-09-10
Attending: INTERNAL MEDICINE
Payer: MEDICARE

## 2018-09-10 DIAGNOSIS — J45.41 MODERATE PERSISTENT ASTHMA WITH ACUTE EXACERBATION: ICD-10-CM

## 2018-09-10 PROCEDURE — 94726 PLETHYSMOGRAPHY LUNG VOLUMES: CPT | Performed by: INTERNAL MEDICINE

## 2018-09-10 PROCEDURE — 94060 EVALUATION OF WHEEZING: CPT | Performed by: INTERNAL MEDICINE

## 2018-09-10 PROCEDURE — 71046 X-RAY EXAM CHEST 2 VIEWS: CPT | Mod: TC,FY | Performed by: INTERNAL MEDICINE

## 2018-09-10 PROCEDURE — 94729 DIFFUSING CAPACITY: CPT | Performed by: INTERNAL MEDICINE

## 2018-09-10 ASSESSMENT — PULMONARY FUNCTION TESTS
FEV1/FVC_PERCENT_LLN: 64
FEV1_PERCENT_PREDICTED: 108
FEV1: 2.57
FEV1/FVC_PERCENT_PREDICTED: 113
FVC: 2.98
FEV1_LLN: 1.98
FEV1/FVC_PERCENT_CHANGE: 0
FEV1_PERCENT_PREDICTED: 108
FEV1/FVC_PERCENT_PREDICTED: 76
FEV1_PERCENT_CHANGE: 0
FVC_PERCENT_PREDICTED: 96
FEV1/FVC_PERCENT_PREDICTED: 111
FEV1/FVC: 86
FEV1/FVC_PERCENT_PREDICTED: 112
FEV1/FVC_PERCENT_PREDICTED: 114
FVC_PERCENT_PREDICTED: 95
FEV1/FVC_PREDICTED: 77
FEV1_PREDICTED: 2.37
FEV1/FVC_PERCENT_CHANGE: 0
FVC_LLN: 2.60
FVC: 3.01
FEV1/FVC: 86
FEV1_PERCENT_CHANGE: 1
FEV1/FVC: 86
FVC_PREDICTED: 3.11
FEV1/FVC: 85.71
FEV1: 2.58

## 2018-09-10 NOTE — PROCEDURES
Good patient effort & cooperation.  The results of this test meet the ATS/ERS standards for acceptability and repeatability.  Predicted equations for Spirometry are N-Carisa II, ITS for lung volumes, and Johns-[place] for DLCO.  The DLCO was uncorrected for Hgb.  A bronchodilator of Ventolin HFA- 2puffs via spacer were administered.  DLCO was performed during dilation period.    The FVC is 3.01 L or 96%, FEV1 is 2.58 L 108%, FEV1/FVC 86%.  %.  DLCO 119%.  No bronchodilator response.    Interpretation:  Normal lung capacity and diffusion capacity.

## 2018-09-12 DIAGNOSIS — I10 BENIGN ESSENTIAL HTN: ICD-10-CM

## 2018-09-12 DIAGNOSIS — J45.20 MILD INTERMITTENT ASTHMA WITHOUT COMPLICATION: ICD-10-CM

## 2018-09-12 NOTE — TELEPHONE ENCOUNTER
Was the patient seen in the last year in this department? Yes    Does patient have an active prescription for medications requested? Yes    Received Request Via: Pharmacy   Pt would like [facility]   Last visit 618/2018  Last labs 8/14/2018

## 2018-09-13 RX ORDER — HYDROCHLOROTHIAZIDE 25 MG/1
25 TABLET ORAL DAILY
Qty: 90 TAB | Refills: 1 | Status: SHIPPED | OUTPATIENT
Start: 2018-09-13 | End: 2019-08-15 | Stop reason: SDUPTHER

## 2018-09-18 ENCOUNTER — OFFICE VISIT (OUTPATIENT)
Dept: PULMONOLOGY | Facility: HOSPICE | Age: 68
End: 2018-09-18
Payer: MEDICARE

## 2018-09-18 VITALS
WEIGHT: 215 LBS | BODY MASS INDEX: 36.7 KG/M2 | HEART RATE: 70 BPM | OXYGEN SATURATION: 97 % | TEMPERATURE: 98 F | SYSTOLIC BLOOD PRESSURE: 112 MMHG | DIASTOLIC BLOOD PRESSURE: 68 MMHG | RESPIRATION RATE: 16 BRPM | HEIGHT: 64 IN

## 2018-09-18 DIAGNOSIS — E66.9 OBESITY (BMI 30-39.9): ICD-10-CM

## 2018-09-18 DIAGNOSIS — Z00.00 HEALTH CARE MAINTENANCE: ICD-10-CM

## 2018-09-18 DIAGNOSIS — J45.20 MILD INTERMITTENT ASTHMA WITHOUT COMPLICATION: ICD-10-CM

## 2018-09-18 PROCEDURE — G0008 ADMIN INFLUENZA VIRUS VAC: HCPCS | Performed by: PHYSICIAN ASSISTANT

## 2018-09-18 PROCEDURE — 99214 OFFICE O/P EST MOD 30 MIN: CPT | Mod: 25 | Performed by: PHYSICIAN ASSISTANT

## 2018-09-18 PROCEDURE — 90662 IIV NO PRSV INCREASED AG IM: CPT | Performed by: PHYSICIAN ASSISTANT

## 2018-09-18 NOTE — PROGRESS NOTES
CC    HPI:  Michell Moon is a 68 y.o. year old female here today for follow-up on her asthma, PFT results, CXR and lab work. States Spiriva is working wonders for her but also this is the time of the year that she relates she does better due to decreased ragweed and other triggers.  Is using Spiriva Respimat once a day encouraged 2 puffs as ordered and has albuterol MDI for rescue inhaler which she has not required. We began with lab work results:  Definitely is reactive to cat dander with level of 2.72 (less o.34 normal) which is problematic for her due to her 3 yo family pet. Strategies discussed include bedroom being a cat free zone: keep door closed during day, do not allow pet to sleep with her.  IgE level was 1658.  PFT results from 9/10 revealed an FEV1 of 2.58L or 108% of predicted, FVC of 3.01 L or 96% predicted, the FEV1/FVC ratio was 86% predicted, FEF 25-75 was 142% predicted, TLC was 127% predicted and DLCO was 119% pred. No bronchodilator response at this time.  Also reviewed chest x-ray from 9-10 which revealed per radiology report no areas of airspace disease, no effusion or pneumothorax, and no enlargement of her heart, some atherosclerotic calcification in the aortic arch.  Patient BMI in excess of 35, discussed strategies for increasing activity and decreasing portion size with emphasis on content.  Set initial goal for BMI below 35.      ROS:     Constitutional: Denies fever, chills, unintentional weight loss and fatigue  Skin: No rashes lumps sores or dryness, no hair or nail changes, does have varicose veins both lower extremities  Eyes: Checkup pending no visual changes  ENT: Denies earaches, sore throat, sore tongue, hoarseness, nasal stuffiness, runny nose  Respiratory: Patient denies cough, production, shortness of breath, wheezing  CV: Denies chest pain, chest tightness, palpitations, edema  GI: No difficulty swallowing    Past Medical History:   Diagnosis Date   • Asthma    • Bronchitis   "  • Chickenpox    • COPD (chronic obstructive pulmonary disease) (HCC)    • Hypertension    • Mumps    • Osteoporosis    • Pulmonary emphysema (HCC)        Past Surgical History:   Procedure Laterality Date   • ATHROPLASTY      hip r   • HIP REPLACEMENT, TOTAL     • HYSTERECTOMY LAPAROSCOPY         Family History   Problem Relation Age of Onset   • Prostate cancer Father        Social History     Social History   • Marital status: Single     Spouse name: N/A   • Number of children: N/A   • Years of education: N/A     Occupational History   • Not on file.     Social History Main Topics   • Smoking status: Former Smoker     Packs/day: 0.25     Years: 4.00     Types: Cigarettes     Quit date: 3/1/2011   • Smokeless tobacco: Never Used      Comment: continued abstinance   • Alcohol use Yes      Comment: occasional   • Drug use: No   • Sexual activity: Not Currently     Partners: Male     Other Topics Concern   • Not on file     Social History Narrative   • No narrative on file       Allergies as of 09/18/2018 - Reviewed 09/18/2018   Allergen Reaction Noted   • Pollen extract  08/13/2018   • Atorvastatin Vomiting 01/18/2018        Vital signs for this encounter:  Vitals:    09/18/18 0915   Height: 1.626 m (5' 4\")   Weight: 97.5 kg (215 lb)   Weight % change since last entry.: 0 %   BP: 112/68   Pulse: 70   BMI (Calculated): 36.9   Resp: 16   Temp: 36.7 °C (98 °F)       Current medications as of today   Current Outpatient Prescriptions   Medication Sig Dispense Refill   • hydroCHLOROthiazide (HYDRODIURIL) 25 MG Tab Take 1 Tab by mouth every day. 90 Tab 1   • Tiotropium Bromide Monohydrate (SPIRIVA RESPIMAT) 2.5 MCG/ACT Aero Soln Inhale 2 Inhalation by mouth every day. Assemble and prime. 1 Inhaler 6   • albuterol (VENTOLIN HFA) 108 (90 Base) MCG/ACT Aero Soln inhalation aerosol Inhale 2 Puffs by mouth every 6 hours as needed for Shortness of Breath.     • Omega-3 Fatty Acids (FISH OIL) 1000 MG Cap capsule Take 1,000 mg by " mouth 3 times a day, with meals.     • Garlic 10 MG Cap Take  by mouth.     • vitamin E (VITAMIN E) 1000 UNIT Cap Take 1 Cap by mouth every day.     • Fluticasone Furoate-Vilanterol (BREO ELLIPTA) 200-25 MCG/INH AEROSOL POWDER, BREATH ACTIVATED Inhale 1 Puff by mouth every day. 1 Each 6   • cetirizine (ZYRTEC ALLERGY) 10 MG Tab Take 10 mg by mouth every day.     • ipratropium-albuterol (DUONEB) 0.5-2.5 (3) MG/3ML nebulizer solution 3 mL by Nebulization route every 6 hours as needed for Shortness of Breath. 30 Bullet 0   • MethylPREDNISolone (MEDROL DOSEPAK) 4 MG Tablet Therapy Pack Take as directed with food 21 Tab 0   • albuterol 108 (90 Base) MCG/ACT Aero Soln inhalation aerosol Inhale 1-2 Puffs by mouth every 6 hours as needed for Shortness of Breath. 1 Inhaler 0   • ipratropium-albuterol (COMBIVENT RESPIMAT)  MCG/ACT Aero Soln Inhale 1 Puff by mouth 4 times a day.       No current facility-administered medications for this visit.          Physical Exam:                                   Gen:           Alert and oriented, No apparent distress. Mood and affect appropriate, normal interaction                     with provider.  Eyes:          sclere white, conjunctive moist.  Hearing:     Grossly intact.  Dentition:    Good dentition.  Oropharynx:   Tongue normal, posterior pharynx without erythema or exudate.  Mallampati Classification: II  Neck:        Supple, trachea midline, no masses.  Respiratory Effort: No intercostal retractions or use of accessory muscles.   Lung Auscultation:      Clear to auscultation bilaterally; no rales, rhonchi or wheezing.  CV:            Regular rate and rhythm. No murmurs, rubs or gallops appreciated.  Digits, Nails, Ext: No clubbing, cyanosis, petechiae, or nodes.   Skin:        No rashes, lesions or ulcers noted on exposed skin surfaces.                     Assessment:  1. Mild intermittent asthma without complication     2. Health care maintenance  INFLUENZA VACCINE, HIGH  DOSE (65+ ONLY)   3. Obesity (BMI 30-39.9)  OBESITY COUNSELING (No Charge): Patient identified as having weight management issue.  Appropriate orders and counseling given.       Immunizations:    Flu:given today  Pneumovax 23: due 9/18/2019  Prevnar 13: Given 3/28/2017    Plan:    1-flu shot today-done  2-BMI at 36.9 work towards less than 204lbs  3-avoid contact with cat/cat dander as much as you can but especially at night, keep door closed during day  4-schedule 3 month follow up appointment sooner if needed    Reviewed lab work with Dr. Lewis including IgE of 1658, CAT hair dander of 2.72, aspergillus fumigatus 1.67, Alternaria tenius 5.43.  Patient may be candidate for a biologic such as Xolair based on high IgE levels.  Consider this option if her pulmonary status should become less stable or worsen.    This dictation was created using voice recognition software. The accuracy of the dictation is limited to the abilities of the software. I expect there may be some errors of grammar and possibly content.

## 2018-09-18 NOTE — PATIENT INSTRUCTIONS
1-flu shot today-done  2-BMI at 36.9 work towards less than 204lbs  3-avoid contact as much as you can but especially at night, keep door closed during day  4-3 month follow up appointment sooner if needed  5-continue home regimen of Spiriva for maintenance, albuterol MDI for rescue

## 2018-10-29 ENCOUNTER — OFFICE VISIT (OUTPATIENT)
Dept: URGENT CARE | Facility: CLINIC | Age: 68
End: 2018-10-29
Payer: MEDICARE

## 2018-10-29 ENCOUNTER — TELEPHONE (OUTPATIENT)
Dept: URGENT CARE | Facility: CLINIC | Age: 68
End: 2018-10-29

## 2018-10-29 VITALS
RESPIRATION RATE: 20 BRPM | OXYGEN SATURATION: 90 % | WEIGHT: 206.2 LBS | TEMPERATURE: 97.8 F | SYSTOLIC BLOOD PRESSURE: 140 MMHG | BODY MASS INDEX: 35.39 KG/M2 | HEART RATE: 114 BPM | DIASTOLIC BLOOD PRESSURE: 98 MMHG

## 2018-10-29 DIAGNOSIS — R06.2 WHEEZING: ICD-10-CM

## 2018-10-29 PROCEDURE — 94760 N-INVAS EAR/PLS OXIMETRY 1: CPT | Mod: 59 | Performed by: FAMILY MEDICINE

## 2018-10-29 PROCEDURE — 99214 OFFICE O/P EST MOD 30 MIN: CPT | Mod: 25 | Performed by: FAMILY MEDICINE

## 2018-10-29 PROCEDURE — 94640 AIRWAY INHALATION TREATMENT: CPT | Performed by: FAMILY MEDICINE

## 2018-10-29 RX ORDER — PREDNISONE 20 MG/1
TABLET ORAL
Qty: 21 TAB | Refills: 0 | Status: SHIPPED | OUTPATIENT
Start: 2018-10-29 | End: 2020-06-16

## 2018-10-29 RX ORDER — MONTELUKAST SODIUM 10 MG/1
10 TABLET ORAL
Qty: 30 TAB | Refills: 1 | Status: SHIPPED | OUTPATIENT
Start: 2018-10-29 | End: 2020-09-22

## 2018-10-29 RX ORDER — IPRATROPIUM BROMIDE AND ALBUTEROL SULFATE 2.5; .5 MG/3ML; MG/3ML
3 SOLUTION RESPIRATORY (INHALATION) ONCE
Status: COMPLETED | OUTPATIENT
Start: 2018-10-29 | End: 2018-10-29

## 2018-10-29 RX ORDER — METHYLPREDNISOLONE SODIUM SUCCINATE 125 MG/2ML
125 INJECTION, POWDER, LYOPHILIZED, FOR SOLUTION INTRAMUSCULAR; INTRAVENOUS ONCE
Status: COMPLETED | OUTPATIENT
Start: 2018-10-29 | End: 2018-10-29

## 2018-10-29 RX ORDER — ALBUTEROL SULFATE 2.5 MG/3ML
2.5 SOLUTION RESPIRATORY (INHALATION) EVERY 4 HOURS PRN
Qty: 30 BULLET | Refills: 2 | Status: SHIPPED | OUTPATIENT
Start: 2018-10-29 | End: 2020-06-08 | Stop reason: SDUPTHER

## 2018-10-29 RX ORDER — ALBUTEROL SULFATE 90 UG/1
2 AEROSOL, METERED RESPIRATORY (INHALATION) EVERY 6 HOURS PRN
Qty: 8.5 G | Refills: 3 | Status: SHIPPED | OUTPATIENT
Start: 2018-10-29 | End: 2019-01-31 | Stop reason: SDUPTHER

## 2018-10-29 RX ADMIN — IPRATROPIUM BROMIDE AND ALBUTEROL SULFATE 3 ML: 2.5; .5 SOLUTION RESPIRATORY (INHALATION) at 16:27

## 2018-10-29 RX ADMIN — METHYLPREDNISOLONE SODIUM SUCCINATE 125 MG: 125 INJECTION, POWDER, LYOPHILIZED, FOR SOLUTION INTRAMUSCULAR; INTRAVENOUS at 16:27

## 2018-10-29 ASSESSMENT — ENCOUNTER SYMPTOMS
COUGH: 1
FEVER: 0
DIZZINESS: 0
NAUSEA: 0
VOMITING: 0
WHEEZING: 1
CHILLS: 0

## 2018-10-29 NOTE — TELEPHONE ENCOUNTER
Pt called and stated that she is again having trouble with her breathing. Apparently the same thing that she came in for last time when she saw you. She would like to know if you could just prescribe her the same medications so it would be less expensive, or if she would need to come in and be reevaluated.          Please advise    Thank you and have a great day      Dayana

## 2018-10-29 NOTE — PROGRESS NOTES
Subjective:      Michell Moon is a 68 y.o. female who presents with Shortness of Breath      - This is a very pleasant, well and non-toxic appearing 68 y.o. female with complaints of asthma has been acting up past 2-3 wks, worse today w/ cough wheezing hard to franklin deep breath. No NVFC/CP.           ALLERGIES:  Pollen extract and Atorvastatin     PMH:  Past Medical History:   Diagnosis Date   • Asthma    • Bronchitis    • Chickenpox    • COPD (chronic obstructive pulmonary disease) ([facility])    • Hypertension    • Mumps    • Osteoporosis    • Pulmonary emphysema ([facility])         MEDS:    Current Outpatient Prescriptions:   •  predniSONE (DELTASONE) 20 MG Tab, 1 tab TID x 3 days, then 1 tab BID x 4 days, then 1 tab QD x 4 days, Disp: 21 Tab, Rfl: 0  •  montelukast (SINGULAIR) 10 MG Tab, Take 1 Tab by mouth every bedtime., Disp: 30 Tab, Rfl: 1  •  albuterol (PROAIR HFA) 108 (90 Base) MCG/ACT Aero Soln inhalation aerosol, Inhale 2 Puffs by mouth every 6 hours as needed for Shortness of Breath., Disp: 8.5 g, Rfl: 3  •  albuterol (PROVENTIL) 2.5mg/3ml Nebu Soln solution for nebulization, 3 mL by Nebulization route every four hours as needed for Shortness of Breath., Disp: 30 Bullet, Rfl: 2  •  hydroCHLOROthiazide (HYDRODIURIL) 25 MG Tab, Take 1 Tab by mouth every day., Disp: 90 Tab, Rfl: 1  •  Fluticasone Furoate-Vilanterol (BREO ELLIPTA) 200-25 MCG/INH AEROSOL POWDER, BREATH ACTIVATED, Inhale 1 Puff by mouth every day., Disp: 1 Each, Rfl: 6  •  Tiotropium Bromide Monohydrate (SPIRIVA RESPIMAT) 2.5 MCG/ACT Aero Soln, Inhale 2 Inhalation by mouth every day. Assemble and prime., Disp: 1 Inhaler, Rfl: 6  •  cetirizine (ZYRTEC ALLERGY) 10 MG Tab, Take 10 mg by mouth every day., Disp: , Rfl:   •  Omega-3 Fatty Acids (FISH OIL) 1000 MG Cap capsule, Take 1,000 mg by mouth 3 times a day, with meals., Disp: , Rfl:   •  Garlic 10 MG Cap, Take  by mouth., Disp: , Rfl:   •  vitamin E (VITAMIN E) 1000 UNIT Cap, Take 1 Cap by mouth every  day., Disp: , Rfl:     ** I have documented what I find to be significant in regards to past medical, social, family and surgical history  in my HPI or under PMH/PSH/FH review section, otherwise it is contributory **           HPI    Review of Systems   Constitutional: Negative for chills and fever.   Respiratory: Positive for cough and wheezing.    Gastrointestinal: Negative for nausea and vomiting.   Neurological: Negative for dizziness.   All other systems reviewed and are negative.         Objective:     /98   Pulse (!) 114   Temp 36.6 °C (97.8 °F)   Resp 20   Wt 93.5 kg (206 lb 3.2 oz)   SpO2 90%   BMI 35.39 kg/m²    98%RA after 2 nebs and says feels much better   Physical Exam   Constitutional: She appears well-developed. No distress.   HENT:   Head: Normocephalic and atraumatic.   Mouth/Throat: Oropharynx is clear and moist.   Eyes: Conjunctivae are normal.   Neck: Neck supple.   Cardiovascular: Regular rhythm.    No murmur heard.  Pulmonary/Chest: Effort normal. No respiratory distress. She has wheezes.   Neurological: She is alert. She exhibits normal muscle tone.   Skin: Skin is warm and dry.   Psychiatric: She has a normal mood and affect. Judgment normal.   Nursing note and vitals reviewed.              Assessment/Plan:         1. Wheezing  ipratropium-albuterol (DUONEB) nebulizer solution    methylPREDNISolone sod succ (SOLU-MEDROL) 125 MG injection 125 mg    predniSONE (DELTASONE) 20 MG Tab    montelukast (SINGULAIR) 10 MG Tab    albuterol (PROAIR HFA) 108 (90 Base) MCG/ACT Aero Soln inhalation aerosol    albuterol (PROVENTIL) 2.5mg/3ml Nebu Soln solution for nebulization             Dx & d/c instructions discussed w/ patient and/or family members.     ER precautions (worsening signs symptoms and when to go to ER) discussed.    Follow up w/ PCP in 2-3 days to make sure symptoms improving and no further intervention/treatment and/or work-up needed was advised, ER if feeling worse or not  improving in 2 days.    Possible side effects (i.e. Rash, GI upset/constipation, sedation, elevation of BP or sugars) of any medications given discussed.     Patient left in stable condition

## 2018-11-19 ENCOUNTER — TELEPHONE (OUTPATIENT)
Dept: MEDICAL GROUP | Facility: PHYSICIAN GROUP | Age: 68
End: 2018-11-19

## 2018-11-19 NOTE — TELEPHONE ENCOUNTER
Patient called and was questioning her 50.00 bill she received for a physical I called the billing dept and this bill was for her disability paperwork which is still considered a physical . I relayed that message to the patient and she was going to call her insurance company. Patient thanked me for the call.

## 2018-11-23 ENCOUNTER — OFFICE VISIT (OUTPATIENT)
Dept: URGENT CARE | Facility: CLINIC | Age: 68
End: 2018-11-23
Payer: MEDICARE

## 2018-11-23 ENCOUNTER — APPOINTMENT (OUTPATIENT)
Dept: RADIOLOGY | Facility: IMAGING CENTER | Age: 68
End: 2018-11-23
Attending: NURSE PRACTITIONER
Payer: MEDICARE

## 2018-11-23 VITALS
WEIGHT: 209 LBS | BODY MASS INDEX: 35.68 KG/M2 | TEMPERATURE: 97.1 F | RESPIRATION RATE: 22 BRPM | DIASTOLIC BLOOD PRESSURE: 100 MMHG | OXYGEN SATURATION: 94 % | HEART RATE: 100 BPM | SYSTOLIC BLOOD PRESSURE: 130 MMHG | HEIGHT: 64 IN

## 2018-11-23 DIAGNOSIS — J20.9 ACUTE BRONCHITIS, UNSPECIFIED ORGANISM: ICD-10-CM

## 2018-11-23 DIAGNOSIS — J41.0 SIMPLE CHRONIC BRONCHITIS (HCC): ICD-10-CM

## 2018-11-23 DIAGNOSIS — R05.9 COUGH: ICD-10-CM

## 2018-11-23 PROCEDURE — 99214 OFFICE O/P EST MOD 30 MIN: CPT | Performed by: NURSE PRACTITIONER

## 2018-11-23 PROCEDURE — 71046 X-RAY EXAM CHEST 2 VIEWS: CPT | Mod: 26 | Performed by: NURSE PRACTITIONER

## 2018-11-23 RX ORDER — PREDNISONE 20 MG/1
TABLET ORAL
Qty: 15 TAB | Refills: 0 | Status: SHIPPED | OUTPATIENT
Start: 2018-11-23 | End: 2020-06-16

## 2018-11-23 RX ORDER — DOXYCYCLINE HYCLATE 100 MG
100 TABLET ORAL 2 TIMES DAILY
Qty: 20 TAB | Refills: 0 | Status: SHIPPED | OUTPATIENT
Start: 2018-11-23 | End: 2018-12-03

## 2018-11-23 RX ORDER — CODEINE PHOSPHATE AND GUAIFENESIN 10; 100 MG/5ML; MG/5ML
5 SOLUTION ORAL EVERY 6 HOURS PRN
Qty: 120 ML | Refills: 0 | Status: SHIPPED | OUTPATIENT
Start: 2018-11-23 | End: 2018-11-30

## 2018-11-23 RX ORDER — METHYLPREDNISOLONE SODIUM SUCCINATE 125 MG/2ML
125 INJECTION, POWDER, LYOPHILIZED, FOR SOLUTION INTRAMUSCULAR; INTRAVENOUS ONCE
Status: COMPLETED | OUTPATIENT
Start: 2018-11-23 | End: 2018-11-23

## 2018-11-23 RX ADMIN — METHYLPREDNISOLONE SODIUM SUCCINATE 125 MG: 125 INJECTION, POWDER, LYOPHILIZED, FOR SOLUTION INTRAMUSCULAR; INTRAVENOUS at 11:09

## 2018-11-23 ASSESSMENT — ENCOUNTER SYMPTOMS
CHILLS: 0
DIFFICULTY BREATHING: 1
WHEEZING: 1
SPUTUM PRODUCTION: 1
FEVER: 0
CHEST TIGHTNESS: 1
SHORTNESS OF BREATH: 1
COUGH: 1

## 2018-11-23 ASSESSMENT — COPD QUESTIONNAIRES: COPD: 1
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
## 2018-12-17 ENCOUNTER — OFFICE VISIT (OUTPATIENT)
Dept: PULMONOLOGY | Facility: HOSPICE | Age: 68
End: 2018-12-17
Payer: MEDICARE

## 2018-12-17 VITALS
HEART RATE: 98 BPM | RESPIRATION RATE: 16 BRPM | WEIGHT: 204 LBS | SYSTOLIC BLOOD PRESSURE: 136 MMHG | OXYGEN SATURATION: 94 % | DIASTOLIC BLOOD PRESSURE: 78 MMHG | HEIGHT: 64 IN | BODY MASS INDEX: 34.83 KG/M2

## 2018-12-17 DIAGNOSIS — J45.40 MODERATE PERSISTENT ASTHMA, UNSPECIFIED WHETHER COMPLICATED: ICD-10-CM

## 2018-12-17 DIAGNOSIS — B37.0 THRUSH, ORAL: ICD-10-CM

## 2018-12-17 PROCEDURE — 99213 OFFICE O/P EST LOW 20 MIN: CPT | Performed by: PHYSICIAN ASSISTANT

## 2018-12-17 RX ORDER — FLUCONAZOLE 100 MG/1
100 TABLET ORAL DAILY
Qty: 10 TAB | Refills: 0 | Status: SHIPPED | OUTPATIENT
Start: 2018-12-17 | End: 2018-12-27

## 2018-12-17 NOTE — PATIENT INSTRUCTIONS
1-first take Diflucan for thrush x 10 days  2-restart BREO rinse really, really well  3-continue ventolin rescue inhaler not more than 6 times per day better less  4- meantime work on cutting back perfume, body oils etc  5-consider alternative placement for cat  6-talk about Xolair in February

## 2018-12-18 NOTE — PROGRESS NOTES
C increased wheezing    HPI:  Michell Moon is a 68 y.o. year old female here today for follow-up on asthma.  Patient last seen 9/18/2018 by myself.  At that time she stated Suzie was working wonders for her but also related it was the time a year that she usually did really well due to decreased ragweed and other triggers.  Michell was not requiring use of her rescue inhaler.  We reviewed lab work at that time including elevated IgE at 1643 as well as allergy testing including a high sensitivity for cat dander, Aspergillus fumigatus, Alternaria tenuous. Patient has a cat at home and at that time was unwilling to discuss options for alternative Placement.  This was recommended however strategies discussed included keeping her bedroom a cat free zone, keeping the door closed during the day, not allowing cat to sleep with her etc. reviewed today's vital signs including blood pressure of 136/78, heart rate of 98, O2 saturation 94% on room air, BMI of 35.02 which was down from 36.7.     Since her last visit however patient has had 2 urgent care visits for increased respiratory distress both requiring a steroid course, and the last one on 11/23 requiring antibiotics.  Patient is just wrapping up her second steroid course.  During office visit very strong perfume usage was noted.  Patient was amenable to discussing that as a potential trigger.  Apparently she likes to layer multiple scented products. She has apparently changed her work environment due to secondhand smoke exposure.  She does report increased use of rescue inhaler to 3-4 times a day.  After previous visit did discuss case with Dr. Lewis.  Patient a potential candidate for biologic use such as Xolair.  Did discuss this with patient at this visit.  Will provide patient with additional information to review.  Patient would like to optimize home environment and avoiding triggers before pursuing more aggressive therapy.  Schedule follow-up to evaluate benefit.  May need repeat PFT's to evaluate when she is not doing as well as at previous visit.     ROS:  Constitutional: Denies fever, chills, night sweats, increase in fatigue levels, unintentional weight loss  Skin: No rashes, hair or nail changes, lumps, sores  Eyes: No glasses, no sudden onset blurring or other vision changes  The colon denies dentures, has history of allergies, history of sinus infections, mild hoarseness, no persistent sore throat, no earaches, no nasal congestion or runny nose  GI: Denies heartburn or reflux, no tolerable swallowing  Respiratory: Intermittent cough primarily nonproductive but occasionally with white secretions, intermittent wheezing, shortness of breath with activity, occupational exposure to secondhand smoke, denies pneumonia or bronchitis  CV: Denies history of murmurs, chest pain, irregular heartbeat, edema    Past Medical History:   Diagnosis Date   • Asthma    • Bronchitis    • Chickenpox    • COPD (chronic obstructive pulmonary disease) ([facility])    • Hypertension    • Mumps    • Osteoporosis    • Pulmonary emphysema (HCC)        Past Surgical History:   Procedure Laterality Date   • ATHROPLASTY      hip r   • HIP REPLACEMENT, TOTAL     • HYSTERECTOMY LAPAROSCOPY         Family History   Problem Relation Age of Onset   • Prostate cancer Father        Social History     Social History   • Marital status: Single     Spouse name: N/A   • Number of children: N/A   • Years of education: N/A     Occupational History   • Not on file.     Social History Main Topics   • Smoking status: Former Smoker     Packs/day: 0.25     Years: 4.00     Types: Cigarettes     Quit date: 3/1/2011   • Smokeless tobacco: Never Used      Comment: continued abstinance   • Alcohol use Yes      Comment: occasional   • Drug use: No   • Sexual activity: Not Currently     Partners: Male     Other Topics Concern   • Not on file     Social History Narrative   • No narrative on file       Allergies as of 12/17/2018 -  "Reviewed 12/17/2018   Allergen Reaction Noted   • Pollen extract  08/13/2018   • Atorvastatin Vomiting 01/18/2018        @Vital signs for this encounter:  Vitals:    12/17/18 0908   Height: 1.626 m (5' 4\")   Weight: 92.5 kg (204 lb)   Weight % change since last entry.: 0 %   BP: 136/78   Pulse: 98   BMI (Calculated): 35.02   Resp: 16   O2 sat % room air: 94 %       Current medications as of today   Current Outpatient Prescriptions   Medication Sig Dispense Refill   • fluconazole (DIFLUCAN) 100 MG Tab Take 1 Tab by mouth every day for 10 days. 10 Tab 0   • albuterol (PROVENTIL) 2.5mg/3ml Nebu Soln solution for nebulization 3 mL by Nebulization route every four hours as needed for Shortness of Breath. 30 Bullet 2   • hydroCHLOROthiazide (HYDRODIURIL) 25 MG Tab Take 1 Tab by mouth every day. 90 Tab 1   • Fluticasone Furoate-Vilanterol (BREO ELLIPTA) 200-25 MCG/INH AEROSOL POWDER, BREATH ACTIVATED Inhale 1 Puff by mouth every day. 1 Each 6   • Tiotropium Bromide Monohydrate (SPIRIVA RESPIMAT) 2.5 MCG/ACT Aero Soln Inhale 2 Inhalation by mouth every day. Assemble and prime. 1 Inhaler 6   • cetirizine (ZYRTEC ALLERGY) 10 MG Tab Take 10 mg by mouth every day.     • Omega-3 Fatty Acids (FISH OIL) 1000 MG Cap capsule Take 1,000 mg by mouth 3 times a day, with meals.     • Garlic 10 MG Cap Take  by mouth.     • vitamin E (VITAMIN E) 1000 UNIT Cap Take 1 Cap by mouth every day.     • predniSONE (DELTASONE) 20 MG Tab Take 1 tablet 3 times daily for 5 days. (Patient not taking: Reported on 12/17/2018) 15 Tab 0   • predniSONE (DELTASONE) 20 MG Tab 1 tab TID x 3 days, then 1 tab BID x 4 days, then 1 tab QD x 4 days (Patient not taking: Reported on 11/23/2018) 21 Tab 0   • montelukast (SINGULAIR) 10 MG Tab Take 1 Tab by mouth every bedtime. (Patient not taking: Reported on 11/23/2018) 30 Tab 1   • albuterol (PROAIR HFA) 108 (90 Base) MCG/ACT Aero Soln inhalation aerosol Inhale 2 Puffs by mouth every 6 hours as needed for Shortness " of Breath. 8.5 g 3     No current facility-administered medications for this visit.          Physical Exam:   Gen:           Alert and oriented, No apparent distress. Mood and affect appropriate, normal interaction with provider.  Eyes:          sclere white, conjunctive moist.  Hearing:     Grossly intact.  Dentition:    Good dentition.  Oropharynx:   Tongue white coating noted, posterior pharynx without erythema or exudate.  Neck:        Supple, trachea midline, no masses.  Respiratory Effort: No intercostal retractions or use of accessory muscles.   Lung Auscultation:      Coarse scattered expiratory wheezing improving with cough; no rales.  CV:            Regular rate and rhythm. No murmurs, rubs or gallops appreciated.  No edema  Digits, Nails, Ext: No clubbing, cyanosis, petechiae, or nodes.   Skin:        No rashes, lesions or ulcers noted on back or exposed skin surfaces.                     Assessment:  1. Asthma, moderate persistent, recent exacerbation x 2.     Breo, Montelukast, ventolin, consider biologic  2. Oral thrush         Diflucan     Immunizations:    Flu: 9/18/2018  Pneumovax 23: Deferred  Prevnar 13: 12/9/2016    Plan:  1-first take Diflucan for thrush x 10 days  2-restart BREO rinse really, really well  3-continue ventolin rescue inhaler not more than 6 times per day better less  4- meantime work on cutting back perfume, body oils etc  5-consider alternative placement for cat  6-talk about Xolair in February, return sooner if needed, will send patient additional information      This dictation was created using voice recognition software. The accuracy of the dictation is limited to the abilities of the software. I expect there may be some errors of grammar and possibly content.

## 2018-12-19 ENCOUNTER — TELEPHONE (OUTPATIENT)
Dept: MEDICAL GROUP | Facility: PHYSICIAN GROUP | Age: 68
End: 2018-12-19

## 2018-12-20 NOTE — TELEPHONE ENCOUNTER
Patient called and left  regarding the assistance with an medical letter for the issues she is having in her personal life and is having issues with her asthma. The patient called again and then stated she does not want any information re laid regarding the personal issue. She is however wanting medical advise when it comes to the SOB she is experiencing. She states she has been going to the urgent care and seen pulmonary that has helped treated but no relief. I advised her that if she is still experiencing this in the next days then to go to the ER, not the urgent care due to the length of time this has been present. The patient stated she will let us know if she needs anything else.

## 2019-01-17 DIAGNOSIS — J45.41 MODERATE PERSISTENT ASTHMA WITH ACUTE EXACERBATION: ICD-10-CM

## 2019-01-18 NOTE — TELEPHONE ENCOUNTER
Patient called and left  regarding medication change due to cost. She is requesting the generic Spiriva. She was prescribed this from the urgent care she went too.     Was not within [place].

## 2019-01-22 ENCOUNTER — TELEPHONE (OUTPATIENT)
Dept: MEDICAL GROUP | Facility: PHYSICIAN GROUP | Age: 69
End: 2019-01-22

## 2019-01-23 DIAGNOSIS — J45.50 SEVERE PERSISTENT ASTHMA, UNSPECIFIED WHETHER COMPLICATED: ICD-10-CM

## 2019-01-23 RX ORDER — BENZONATATE 100 MG/1
100 CAPSULE ORAL 3 TIMES DAILY PRN
Qty: 60 CAP | Refills: 0 | Status: SHIPPED | OUTPATIENT
Start: 2019-01-23 | End: 2020-06-16

## 2019-01-23 RX ORDER — PROMETHAZINE HYDROCHLORIDE 6.25 MG/5ML
6.25 SYRUP ORAL 4 TIMES DAILY PRN
Qty: 120 ML | Refills: 0 | Status: SHIPPED | OUTPATIENT
Start: 2019-01-23 | End: 2020-04-13 | Stop reason: SDUPTHER

## 2019-01-23 NOTE — TELEPHONE ENCOUNTER
----- Message from Michell Moon sent at 1/22/2019  4:57 PM PST -----  Regarding: RE: RE: RE: RE: RE: Voicemail  Contact: [phone]  Ok I will let her know    ----- Message -----  From: Breeanne R. Yeoman, Med Ass't  Sent: 1/22/19 4:55 PM  To: Michell Moon  Subject: RE: RE: RE: RE: Voicemail    We will close the office at 12 until 1 for lunch, so 1pm would be better for her! Sorry for the inconvenience.       ----- Message -----     From: Michell Moon     Sent: 1/22/2019  4:32 PM PST       To: Breeanne R. Yeoman, Med Ass't  Subject: RE: RE: RE: RE: Voicemail    She will come around noon or 1:00 pm. Thank you both    ----- Message -----  From: Breeanne R. Yeoman, Med Ass't  Sent: 1/22/19 4:19 PM  To: Michell Moon  Subject: RE: RE: RE: Voicemail    As long as she can get here before 5, that should not be an issue.      ----- Message -----     From: Michell Moon     Sent: 1/22/2019  4:08 PM PST       To: Breeanne R. Yeoman, Med Ass't  Subject: RE: RE: RE: Voicemail    Thank you soooooooo much. She gets off work in [place] at 4pm tomorrow. She will stop by then. Ok?    ----- Message -----  From: Breeanne R. Yeoman, Med Ass't  Sent: 1/22/19 4:03 PM  To: Michell Moon  Subject: RE: RE: Voicemail    This will not cost you anything for this, this is just like getting an medication refill. Your daughter can pick this up. Give me another hour to get this ready, and you can pick this up tomorrow morning. Does that work?    ----- Message -----     From: Michell Moon     Sent: 1/22/2019  3:56 PM PST       To: Breeanne R. Yeoman, Med Ass't  Subject: RE: RE: Voicemail    Ok calling them right now.     ----- Message -----  From: Breeanne R. Yeoman, Med Ass't  Sent: 1/22/19 3:50 PM  To: Michell Moon  Subject: RE: Voicemail    I got the approval for this. Please respond back with what [facility] is wanting on the prescription so I can put the information right.     Thank you!      ----- Message -----     From: Michell  Red     Sent: 1/22/2019  1:48 PM PST       To: Breeanne R. Yeoman, Med Ass't  Subject: RE: Voicemail    Hello,    [employer] [facility] [employer] is a company that will give me a nebulizer breathing machine for home use. They base it on my [facility], [place] resident, and for seniors. They requested a prescription from your doctor about your need and that's what they requested. [phone] this is their number. Please let me know if you need more information.    This machine will help me with my Albuterol and saline solution, which I will use in the machine.    Thank you    Michell  ----- Message -----  From: Breeanne R. Yeoman, Med Ass't  Sent: 1/22/19 1:40 PM  To: Michell Moon  Subject: Voicemail    Michell-    We have received your voicemail regarding the prescription for the breathing machine. I wanted to see what information you are needing for the rx so I can get the direction from Dr. Perry for how he wants to proceed with this. Please get back to me at your earliest convenience.

## 2019-01-31 DIAGNOSIS — R06.2 WHEEZING: ICD-10-CM

## 2019-01-31 DIAGNOSIS — J45.20 MILD INTERMITTENT ASTHMA WITHOUT COMPLICATION: ICD-10-CM

## 2019-01-31 DIAGNOSIS — J45.41 MODERATE PERSISTENT ASTHMA WITH ACUTE EXACERBATION: ICD-10-CM

## 2019-01-31 RX ORDER — ALBUTEROL SULFATE 90 UG/1
2 AEROSOL, METERED RESPIRATORY (INHALATION) EVERY 6 HOURS PRN
Qty: 8.5 G | Refills: 3 | Status: SHIPPED | OUTPATIENT
Start: 2019-01-31 | End: 2020-04-13

## 2019-02-01 NOTE — TELEPHONE ENCOUNTER
Was the patient seen in the last year in this department? Yes    Does patient have an active prescription for medications requested? Yes    Received Request Via: Pharmacy     Last Visit: 6/18/18  Last Labs: 8/14/18

## 2019-02-20 ENCOUNTER — APPOINTMENT (OUTPATIENT)
Dept: PULMONOLOGY | Facility: HOSPICE | Age: 69
End: 2019-02-20
Payer: MEDICARE

## 2019-02-25 ENCOUNTER — APPOINTMENT (OUTPATIENT)
Dept: PULMONOLOGY | Facility: HOSPICE | Age: 69
End: 2019-02-25
Payer: MEDICARE

## 2019-03-04 ENCOUNTER — APPOINTMENT (OUTPATIENT)
Dept: PULMONOLOGY | Facility: HOSPICE | Age: 69
End: 2019-03-04
Payer: MEDICARE

## 2019-07-15 ENCOUNTER — TELEPHONE (OUTPATIENT)
Dept: HEALTH INFORMATION MANAGEMENT | Facility: OTHER | Age: 69
End: 2019-07-15

## 2019-07-15 DIAGNOSIS — Z12.11 SCREENING FOR COLON CANCER: ICD-10-CM

## 2019-07-16 NOTE — TELEPHONE ENCOUNTER
Good afternoon Dr. Perry,    This patient is overdue for health maintenance topics that need orders so she can complete them.     Please review the pended orders in  to sign or make adjustments as appropriate.    Close the encounter when complete.  If declining these orders, please document a reason and route to the Outreach MA pool (p AMB  Outreach).  I will call the patient to cancel the appointment.    Thank you.

## 2019-07-31 ENCOUNTER — OFFICE VISIT (OUTPATIENT)
Dept: MEDICAL GROUP | Facility: PHYSICIAN GROUP | Age: 69
End: 2019-07-31
Payer: MEDICARE

## 2019-07-31 VITALS
HEART RATE: 74 BPM | HEIGHT: 65 IN | BODY MASS INDEX: 36.54 KG/M2 | RESPIRATION RATE: 14 BRPM | TEMPERATURE: 98.4 F | SYSTOLIC BLOOD PRESSURE: 142 MMHG | OXYGEN SATURATION: 99 % | DIASTOLIC BLOOD PRESSURE: 92 MMHG | WEIGHT: 219.3 LBS

## 2019-07-31 DIAGNOSIS — F51.01 PRIMARY INSOMNIA: ICD-10-CM

## 2019-07-31 DIAGNOSIS — J45.20 MILD INTERMITTENT ASTHMA WITHOUT COMPLICATION: ICD-10-CM

## 2019-07-31 DIAGNOSIS — E66.9 OBESITY (BMI 30-39.9): ICD-10-CM

## 2019-07-31 DIAGNOSIS — M17.12 LOCALIZED OSTEOARTHRITIS OF LEFT KNEE: ICD-10-CM

## 2019-07-31 DIAGNOSIS — I10 BENIGN ESSENTIAL HTN: ICD-10-CM

## 2019-07-31 PROCEDURE — 99214 OFFICE O/P EST MOD 30 MIN: CPT | Performed by: FAMILY MEDICINE

## 2019-07-31 RX ORDER — TRAZODONE HYDROCHLORIDE 50 MG/1
50 TABLET ORAL
Qty: 30 TAB | Refills: 3 | Status: SHIPPED | OUTPATIENT
Start: 2019-07-31 | End: 2019-12-04 | Stop reason: SDUPTHER

## 2019-07-31 ASSESSMENT — ENCOUNTER SYMPTOMS
HEMOPTYSIS: 0
BLURRED VISION: 0
GASTROINTESTINAL NEGATIVE: 1
CHILLS: 0
CARDIOVASCULAR NEGATIVE: 1
CONSTITUTIONAL NEGATIVE: 1
NAUSEA: 0
MYALGIAS: 0
DOUBLE VISION: 0
TINGLING: 0
EYES NEGATIVE: 1
NEUROLOGICAL NEGATIVE: 1
FEVER: 0
DEPRESSION: 0
INSOMNIA: 1
DIZZINESS: 0
RESPIRATORY NEGATIVE: 1
HEARTBURN: 0
BRUISES/BLEEDS EASILY: 0
COUGH: 0
PALPITATIONS: 0
HEADACHES: 0

## 2019-07-31 ASSESSMENT — PAIN SCALES - GENERAL: PAINLEVEL: NO PAIN

## 2019-07-31 NOTE — PROGRESS NOTES
Subjective:      Michell Moon is a 69 y.o. female who presents with Annual Exam            1. Benign essential HTN  Currently treated for HTN, taking meds with no CP or sob, monitors bp at home periodically. controlled      2. Mild intermittent asthma without complication  The patient's current medical issue is well controlled on the current therapy with no new symptoms or worsening      3. Obesity (BMI 30-39.9)  Patient has a diagnosis of obesity. They were counseled today on increasing exercise and  extensively counseled on a low carb diet       4. Localized osteoarthritis of left knee  Had steroid injection done 4 years ago in [place] with good results, no hurting again after sitting for extended periods will send to local ortho for eval  - REFERRAL TO ORTHOPEDICS    5. Primary insomnia  Trouble staying asleep at times  Will do trial of trazodone prn    Past Medical History:  No date: Asthma  No date: Bronchitis  No date: Chickenpox  No date: COPD (chronic obstructive pulmonary disease) ([facility])  No date: Hypertension  No date: Mumps  No date: Osteoporosis  No date: Pulmonary emphysema ([facility])  Past Surgical History:  No date: ATHROPLASTY      Comment:  hip r  No date: HIP REPLACEMENT, TOTAL  No date: HYSTERECTOMY LAPAROSCOPY  Social History    Tobacco Use      Smoking status: Former Smoker        Packs/day: 0.25        Years: 4.00        Pack years: 1        Types: Cigarettes        Quit date: 3/1/2011        Years since quittin.4      Smokeless tobacco: Never Used      Tobacco comment: continued abstinance    Alcohol use: Yes      Comment: occasional    Drug use: No    Review of patient's family history indicates:  Problem: Prostate cancer      Relation: Father          Age of Onset: (Not Specified)      Current Outpatient Medications: •  traZODone (DESYREL) 50 MG Tab, Take 1 Tab by mouth at bedtime as needed for Sleep., Disp: 30 Tab, Rfl: 3•  Tiotropium Bromide Monohydrate (SPIRIVA RESPIMAT) 2.5 MCG/ACT  Aero Soln, Inhale 2 Inhalation by mouth every day. Assemble and prime., Disp: 1 Inhaler, Rfl: 6•  Fluticasone Furoate-Vilanterol (BREO ELLIPTA) 200-25 MCG/INH AEROSOL POWDER, BREATH ACTIVATED, Inhale 1 Puff by mouth every day., Disp: 1 Each, Rfl: 6•  albuterol (PROAIR HFA) 108 (90 Base) MCG/ACT Aero Soln inhalation aerosol, Inhale 2 Puffs by mouth every 6 hours as needed for Shortness of Breath., Disp: 8.5 g, Rfl: 3•  NON SPECIFIED, Please dispense nebulizer with supplies, Disp: 1 Each, Rfl: 0•  promethazine (PHENERGAN) 6.25 MG/5ML Syrup, Take 5 mL by mouth 4 times a day as needed for Nausea/Vomiting., Disp: 120 mL, Rfl: 0•  benzonatate (TESSALON) 100 MG Cap, Take 1 Cap by mouth 3 times a day as needed for Cough., Disp: 60 Cap, Rfl: 0•  predniSONE (DELTASONE) 20 MG Tab, Take 1 tablet 3 times daily for 5 days., Disp: 15 Tab, Rfl: 0•  predniSONE (DELTASONE) 20 MG Tab, 1 tab TID x 3 days, then 1 tab BID x 4 days, then 1 tab QD x 4 days, Disp: 21 Tab, Rfl: 0•  montelukast (SINGULAIR) 10 MG Tab, Take 1 Tab by mouth every bedtime., Disp: 30 Tab, Rfl: 1•  albuterol (PROVENTIL) 2.5mg/3ml Nebu Soln solution for nebulization, 3 mL by Nebulization route every four hours as needed for Shortness of Breath., Disp: 30 Bullet, Rfl: 2•  hydroCHLOROthiazide (HYDRODIURIL) 25 MG Tab, Take 1 Tab by mouth every day., Disp: 90 Tab, Rfl: 1•  cetirizine (ZYRTEC ALLERGY) 10 MG Tab, Take 10 mg by mouth every day., Disp: , Rfl: •  Omega-3 Fatty Acids (FISH OIL) 1000 MG Cap capsule, Take 1,000 mg by mouth 3 times a day, with meals., Disp: , Rfl: •  Garlic 10 MG Cap, Take  by mouth., Disp: , Rfl: •  vitamin E (VITAMIN E) 1000 UNIT Cap, Take 1 Cap by mouth every day., Disp: , Rfl:     Patient was instructed on the use of medications, either prescriptions or OTC and informed on when the appropriate follow up time period should be. In addition, patient was also instructed that should any acute worsening occur that they should notify this clinic  "asap or call 911.          Review of Systems   Constitutional: Negative.  Negative for chills and fever.   HENT: Negative.  Negative for hearing loss.    Eyes: Negative.  Negative for blurred vision and double vision.   Respiratory: Negative.  Negative for cough and hemoptysis.    Cardiovascular: Negative.  Negative for chest pain and palpitations.   Gastrointestinal: Negative.  Negative for heartburn and nausea.   Genitourinary: Negative.  Negative for dysuria.   Musculoskeletal: Positive for joint pain. Negative for myalgias.   Skin: Negative.  Negative for rash.   Neurological: Negative.  Negative for dizziness, tingling and headaches.   Endo/Heme/Allergies: Negative.  Does not bruise/bleed easily.   Psychiatric/Behavioral: Negative for depression and suicidal ideas. The patient has insomnia.    All other systems reviewed and are negative.         Objective:     /92 (BP Location: Right arm, Patient Position: Sitting, BP Cuff Size: Adult)   Pulse 74   Temp 36.9 °C (98.4 °F)   Resp 14   Ht 1.651 m (5' 5\")   Wt 99.5 kg (219 lb 4.8 oz)   SpO2 99%   BMI 36.49 kg/m²      Physical Exam   Constitutional: She is oriented to person, place, and time. She appears well-developed and well-nourished. No distress.   HENT:   Head: Normocephalic and atraumatic.   Mouth/Throat: Oropharynx is clear and moist. No oropharyngeal exudate.   Eyes: Pupils are equal, round, and reactive to light.   Cardiovascular: Normal rate, regular rhythm, normal heart sounds and intact distal pulses. Exam reveals no gallop and no friction rub.   No murmur heard.  Pulmonary/Chest: Effort normal and breath sounds normal. No respiratory distress. She has no wheezes. She has no rales. She exhibits no tenderness.   Musculoskeletal:        Left knee: Tenderness found. Medial joint line tenderness noted.   Neurological: She is alert and oriented to person, place, and time.   Skin: She is not diaphoretic.   Psychiatric: She has a normal mood and " affect. Her behavior is normal. Judgment and thought content normal.   Nursing note and vitals reviewed.              Assessment/Plan:     1. Benign essential HTN      2. Mild intermittent asthma without complication      3. Obesity (BMI 30-39.9)      4. Localized osteoarthritis of left knee    - REFERRAL TO ORTHOPEDICS    5. Primary insomnia

## 2019-08-07 ENCOUNTER — PATIENT MESSAGE (OUTPATIENT)
Dept: MEDICAL GROUP | Facility: PHYSICIAN GROUP | Age: 69
End: 2019-08-07

## 2019-08-07 ENCOUNTER — TELEPHONE (OUTPATIENT)
Dept: MEDICAL GROUP | Facility: PHYSICIAN GROUP | Age: 69
End: 2019-08-07

## 2019-08-07 DIAGNOSIS — Z12.31 ENCOUNTER FOR SCREENING MAMMOGRAM FOR HIGH-RISK PATIENT: ICD-10-CM

## 2019-08-12 ENCOUNTER — HOSPITAL ENCOUNTER (OUTPATIENT)
Dept: RADIOLOGY | Facility: MEDICAL CENTER | Age: 69
End: 2019-08-12
Attending: FAMILY MEDICINE
Payer: MEDICARE

## 2019-08-12 DIAGNOSIS — Z12.31 ENCOUNTER FOR SCREENING MAMMOGRAM FOR HIGH-RISK PATIENT: ICD-10-CM

## 2019-08-12 PROCEDURE — 77063 BREAST TOMOSYNTHESIS BI: CPT

## 2019-08-15 DIAGNOSIS — I10 BENIGN ESSENTIAL HTN: ICD-10-CM

## 2019-08-15 RX ORDER — HYDROCHLOROTHIAZIDE 25 MG/1
TABLET ORAL
Qty: 100 TAB | Refills: 1 | Status: SHIPPED | OUTPATIENT
Start: 2019-08-15 | End: 2020-03-09

## 2019-08-15 NOTE — TELEPHONE ENCOUNTER
Was the patient seen in the last year in this department? Yes    Does patient have an active prescription for medications requested? Yes    Received Request Via: Pharmacy     Last visit 7/31/2019  Last labs 8/14/2018

## 2019-12-04 DIAGNOSIS — J30.1 ACUTE SEASONAL ALLERGIC RHINITIS DUE TO POLLEN: ICD-10-CM

## 2019-12-04 DIAGNOSIS — J45.41 MODERATE PERSISTENT ASTHMA WITH ACUTE EXACERBATION: ICD-10-CM

## 2019-12-04 RX ORDER — TRAZODONE HYDROCHLORIDE 50 MG/1
TABLET ORAL
Qty: 30 TAB | Refills: 3 | Status: SHIPPED | OUTPATIENT
Start: 2019-12-04 | End: 2020-06-16

## 2019-12-04 RX ORDER — TIOTROPIUM BROMIDE INHALATION SPRAY 3.12 UG/1
SPRAY, METERED RESPIRATORY (INHALATION)
Qty: 12 G | Refills: 3 | Status: SHIPPED | OUTPATIENT
Start: 2019-12-04 | End: 2021-08-05

## 2019-12-17 RX ORDER — FLUTICASONE PROPIONATE 50 MCG
1 SPRAY, SUSPENSION (ML) NASAL DAILY
Qty: 16 G | Refills: 3 | Status: SHIPPED | OUTPATIENT
Start: 2019-12-17 | End: 2020-06-16

## 2020-02-12 ENCOUNTER — TELEPHONE (OUTPATIENT)
Dept: MEDICAL GROUP | Facility: PHYSICIAN GROUP | Age: 70
End: 2020-02-12

## 2020-02-12 NOTE — TELEPHONE ENCOUNTER
Hello Dr. I am having a hard time with congestion and coughing. Can you please send a prescription to help me?   Maybe cough syrup or something? Suggestion?  My coughing is severe.     Thank you     Michell Moon      Please advise.

## 2020-02-13 RX ORDER — BENZONATATE 100 MG/1
100 CAPSULE ORAL 3 TIMES DAILY PRN
Qty: 60 CAP | Refills: 0 | Status: SHIPPED | OUTPATIENT
Start: 2020-02-13 | End: 2020-06-16

## 2020-02-14 ENCOUNTER — TELEPHONE (OUTPATIENT)
Dept: MEDICAL GROUP | Facility: PHYSICIAN GROUP | Age: 70
End: 2020-02-14

## 2020-02-14 NOTE — TELEPHONE ENCOUNTER
I spoke to the patient. She is requesting a new cough medication as the one given is not covered by [facility]. I tried asking her if the insurance gave her formulary alternatives and she would not let me discuss this with her. She stated that Dr. Perry knows all about this and she has never had to provide anything. Per her request, I am relaying this message. Please advise.

## 2020-02-14 NOTE — TELEPHONE ENCOUNTER
----- Message from Bette Angulo sent at 2/14/2020 10:22 AM PST -----  Regarding: FW: RX on my list  Contact: [phone]    ----- Message -----  From: Michell Moon  Sent: 2/13/2020   3:54 PM PST  To: Priti All Mas  Subject: RX on my list                                    Topic: Referral Status    The cough medicine you ordered is not on my insurance list. Please resubmit the RX.    Thank you    Michell

## 2020-02-15 RX ORDER — PROMETHAZINE HYDROCHLORIDE 6.25 MG/5ML
6.25 SYRUP ORAL 4 TIMES DAILY PRN
Qty: 120 ML | Refills: 0 | Status: SHIPPED | OUTPATIENT
Start: 2020-02-15 | End: 2020-04-13

## 2020-03-06 DIAGNOSIS — I10 BENIGN ESSENTIAL HTN: ICD-10-CM

## 2020-03-09 RX ORDER — HYDROCHLOROTHIAZIDE 25 MG/1
TABLET ORAL
Qty: 90 TAB | Refills: 3 | Status: SHIPPED | OUTPATIENT
Start: 2020-03-09 | End: 2021-04-13

## 2020-04-09 DIAGNOSIS — J45.50 SEVERE PERSISTENT ASTHMA, UNSPECIFIED WHETHER COMPLICATED: ICD-10-CM

## 2020-04-09 DIAGNOSIS — R06.2 WHEEZING: ICD-10-CM

## 2020-04-09 DIAGNOSIS — J45.20 MILD INTERMITTENT ASTHMA WITHOUT COMPLICATION: ICD-10-CM

## 2020-04-09 DIAGNOSIS — M17.12 LOCALIZED OSTEOARTHRITIS OF LEFT KNEE: ICD-10-CM

## 2020-04-13 RX ORDER — ALBUTEROL SULFATE 90 UG/1
AEROSOL, METERED RESPIRATORY (INHALATION)
Qty: 54 G | Refills: 1 | Status: SHIPPED | OUTPATIENT
Start: 2020-04-13 | End: 2020-06-17 | Stop reason: SDUPTHER

## 2020-04-13 RX ORDER — PROMETHAZINE HYDROCHLORIDE 6.25 MG/5ML
6.25 SYRUP ORAL 4 TIMES DAILY PRN
Qty: 120 ML | Refills: 0 | Status: SHIPPED | OUTPATIENT
Start: 2020-04-13 | End: 2020-05-28

## 2020-04-13 RX ORDER — PROMETHAZINE HYDROCHLORIDE 6.25 MG/5ML
6.25 SYRUP ORAL 4 TIMES DAILY PRN
Qty: 120 ML | Refills: 0 | Status: SHIPPED
Start: 2020-04-13 | End: 2020-06-16

## 2020-04-13 NOTE — ADDENDUM NOTE
Addended by: MICHAEL SHORT on: 4/13/2020 10:08 AM     Modules accepted: Orders    
see chief complaint quote

## 2020-05-28 RX ORDER — PROMETHAZINE HYDROCHLORIDE 6.25 MG/5ML
SYRUP ORAL
Qty: 120 ML | Refills: 0 | Status: SHIPPED | OUTPATIENT
Start: 2020-05-28 | End: 2020-06-16

## 2020-06-08 ENCOUNTER — PATIENT MESSAGE (OUTPATIENT)
Dept: MEDICAL GROUP | Facility: PHYSICIAN GROUP | Age: 70
End: 2020-06-08

## 2020-06-08 DIAGNOSIS — R06.2 WHEEZING: ICD-10-CM

## 2020-06-08 RX ORDER — ALBUTEROL SULFATE 2.5 MG/3ML
2.5 SOLUTION RESPIRATORY (INHALATION) EVERY 4 HOURS PRN
Qty: 30 BULLET | Refills: 2 | Status: SHIPPED | OUTPATIENT
Start: 2020-06-08 | End: 2020-06-17 | Stop reason: SDUPTHER

## 2020-06-08 NOTE — TELEPHONE ENCOUNTER
----- Message from Michell Moon sent at 6/8/2020  8:26 AM PDT -----  Regarding: Prescription Question  Contact: [phone]  Ermelinda Perry, I am completely out of Albuterol for the breathing machine. I am in my allergy season and shortness of breath. Please send a RX request to [facility]. Thanks Sowanda much.    Michell Moon

## 2020-06-15 ENCOUNTER — TELEPHONE (OUTPATIENT)
Dept: MEDICAL GROUP | Facility: PHYSICIAN GROUP | Age: 70
End: 2020-06-15

## 2020-06-15 NOTE — TELEPHONE ENCOUNTER
"Called patient to offer her an appointment with Dr. Leggett today at 11:40, using a backlog slot.  The patient stated that she did not want an appointment today with a different doctor and that she \"will wait for Dr. Perry to come back from vacation\" and then hung up the phone.    "

## 2020-06-16 ENCOUNTER — OFFICE VISIT (OUTPATIENT)
Dept: URGENT CARE | Facility: PHYSICIAN GROUP | Age: 70
End: 2020-06-16
Payer: MEDICARE

## 2020-06-16 VITALS
RESPIRATION RATE: 20 BRPM | TEMPERATURE: 97.1 F | BODY MASS INDEX: 36.15 KG/M2 | WEIGHT: 217 LBS | HEART RATE: 102 BPM | DIASTOLIC BLOOD PRESSURE: 118 MMHG | SYSTOLIC BLOOD PRESSURE: 162 MMHG | HEIGHT: 65 IN | OXYGEN SATURATION: 95 %

## 2020-06-16 DIAGNOSIS — J45.901 ASTHMA WITH COPD WITH EXACERBATION (HCC): ICD-10-CM

## 2020-06-16 DIAGNOSIS — R06.2 WHEEZING: ICD-10-CM

## 2020-06-16 DIAGNOSIS — J44.1 ASTHMA WITH COPD WITH EXACERBATION (HCC): ICD-10-CM

## 2020-06-16 DIAGNOSIS — I10 ELEVATED BLOOD PRESSURE READING IN OFFICE WITH DIAGNOSIS OF HYPERTENSION: ICD-10-CM

## 2020-06-16 DIAGNOSIS — J45.20 MILD INTERMITTENT ASTHMA WITHOUT COMPLICATION: ICD-10-CM

## 2020-06-16 DIAGNOSIS — L30.9 ACUTE DERMATITIS: ICD-10-CM

## 2020-06-16 PROBLEM — E78.5 HYPERLIPIDEMIA: Status: ACTIVE | Noted: 2017-12-12

## 2020-06-16 PROCEDURE — 99214 OFFICE O/P EST MOD 30 MIN: CPT | Performed by: NURSE PRACTITIONER

## 2020-06-16 RX ORDER — ALBUTEROL SULFATE 2.5 MG/3ML
2.5 SOLUTION RESPIRATORY (INHALATION) EVERY 4 HOURS PRN
Qty: 30 BULLET | Refills: 0 | Status: SHIPPED | OUTPATIENT
Start: 2020-06-16 | End: 2020-09-22

## 2020-06-16 RX ORDER — BENZONATATE 100 MG/1
100 CAPSULE ORAL 3 TIMES DAILY PRN
Qty: 60 CAP | Refills: 0 | Status: SHIPPED | OUTPATIENT
Start: 2020-06-16 | End: 2020-09-22

## 2020-06-16 RX ORDER — DOXYCYCLINE HYCLATE 100 MG
100 TABLET ORAL 2 TIMES DAILY
Qty: 14 TAB | Refills: 0 | Status: SHIPPED | OUTPATIENT
Start: 2020-06-16 | End: 2020-06-23

## 2020-06-16 RX ORDER — METHYLPREDNISOLONE SODIUM SUCCINATE 125 MG/2ML
125 INJECTION, POWDER, LYOPHILIZED, FOR SOLUTION INTRAMUSCULAR; INTRAVENOUS ONCE
Status: COMPLETED | OUTPATIENT
Start: 2020-06-16 | End: 2020-06-16

## 2020-06-16 RX ORDER — PREDNISONE 10 MG/1
20 TABLET ORAL 2 TIMES DAILY
Qty: 20 TAB | Refills: 0 | Status: SHIPPED | OUTPATIENT
Start: 2020-06-16 | End: 2020-06-21

## 2020-06-16 RX ORDER — TRIAMCINOLONE ACETONIDE 1 MG/G
1 OINTMENT TOPICAL 2 TIMES DAILY
Qty: 1 TUBE | Refills: 0 | Status: SHIPPED | OUTPATIENT
Start: 2020-06-16 | End: 2020-09-22

## 2020-06-16 RX ORDER — TRAZODONE HYDROCHLORIDE 50 MG/1
TABLET ORAL
COMMUNITY
Start: 2020-05-24 | End: 2023-08-15 | Stop reason: SDUPTHER

## 2020-06-16 RX ADMIN — METHYLPREDNISOLONE SODIUM SUCCINATE 125 MG: 125 INJECTION, POWDER, LYOPHILIZED, FOR SOLUTION INTRAMUSCULAR; INTRAVENOUS at 09:16

## 2020-06-16 NOTE — PROGRESS NOTES
Chief Complaint   Patient presents with   • Shortness of Breath     asthma, [id number]        HISTORY OF PRESENT ILLNESS: Patient is a 70 y.o. female who presents to urgent care today with concerns of asthma and COPD exacerbation. Notes that for the past three days she has had a productive cough, wheezing, and shortness of breath. Denies fever, chills, joint pain, respiratory distress. She has a long history of such, has run out of her albuterol nebulizer. She has a typical flare this time per year, usually allergy induced. In addition, admits to a rash to her abdomen for the past three days, also comes with these flare ups, rash is itchy and non tender. She last took her albuterol inhaler yesterday.  As a side note, she does admit to history of hypertension but has not taken her hypertension medications today.  Patient is here today with her daughter, both provide the history.    Patient Active Problem List    Diagnosis Date Noted   • Localized osteoarthritis of left knee 07/31/2019   • Obesity (BMI 30-39.9) 06/18/2018   • Benign essential HTN 03/19/2018   • Mild intermittent asthma without complication 03/19/2018       Allergies:Pollen extract and Atorvastatin    Current Outpatient Medications Ordered in Epic   Medication Sig Dispense Refill   • predniSONE (DELTASONE) 10 MG Tab Take 2 Tabs by mouth 2 times a day for 5 days. Take with food. 20 Tab 0   • albuterol (PROVENTIL) 2.5mg/3ml Nebu Soln solution for nebulization 3 mL by Nebulization route every four hours as needed for Shortness of Breath. 30 Bullet 0   • doxycycline (VIBRAMYCIN) 100 MG Tab Take 1 Tab by mouth 2 times a day for 7 days. 14 Tab 0   • benzonatate (TESSALON) 100 MG Cap Take 1 Cap by mouth 3 times a day as needed for Cough. 60 Cap 0   • triamcinolone acetonide (KENALOG) 0.1 % Ointment Apply 1 Application to affected area(s) 2 times a day. 1 Tube 0   • albuterol (PROVENTIL) 2.5mg/3ml Nebu Soln solution for nebulization 3 mL by Nebulization route  every four hours as needed for Shortness of Breath. 30 Bullet 2   • BREO ELLIPTA 200-25 MCG/INH AEROSOL POWDER, BREATH ACTIVATED INHALE 1 PUFF BY MOUTH  EVERY DAY. 180 Each 1   • VENTOLIN  (90 Base) MCG/ACT Aero Soln inhalation aerosol INHALE 2 PUFFS EVERY 6  HOURS AS NEEDED FOR  SHORTNESS OF BREATH 54 g 1   • hydroCHLOROthiazide (HYDRODIURIL) 25 MG Tab TAKE 1 TABLET BY MOUTH  EVERY DAY 90 Tab 3   • SPIRIVA RESPIMAT 2.5 MCG/ACT Aero Soln INHALE 2 PUFFS BY MOUTH  EVERY DAY. ASSEMBLE AND  PRIME. 12 g 3   • NON SPECIFIED Please dispense nebulizer with supplies 1 Each 0   • montelukast (SINGULAIR) 10 MG Tab Take 1 Tab by mouth every bedtime. 30 Tab 1   • cetirizine (ZYRTEC ALLERGY) 10 MG Tab Take 10 mg by mouth every day.     • Omega-3 Fatty Acids (FISH OIL) 1000 MG Cap capsule Take 1,000 mg by mouth 3 times a day, with meals.     • traZODone (DESYREL) 50 MG Tab        Current Facility-Administered Medications Ordered in Epic   Medication Dose Route Frequency Provider Last Rate Last Dose   • methylPREDNISolone sod succ (SOLU-MEDROL) 125 MG injection 125 mg  125 mg Intravenous Once DANIEL [email or web address]           Past Medical History:   Diagnosis Date   • Asthma    • Bronchitis    • Chickenpox    • COPD (chronic obstructive pulmonary disease) (HCC)    • Hypertension    • Mumps    • Osteoporosis    • Pulmonary emphysema (HCC)        Social History     Tobacco Use   • Smoking status: Former Smoker     Packs/day: 0.25     Years: 4.00     Pack years: 1.00     Types: Cigarettes     Last attempt to quit: 3/1/2011     Years since quittin.3   • Smokeless tobacco: Never Used   • Tobacco comment: continued abstinance   Substance Use Topics   • Alcohol use: Yes     Comment: occasional   • Drug use: No       Family Status   Relation Name Status   • Mo natural causes    • Fa       Family History   Problem Relation Age of Onset   • Prostate cancer Father        ROS:  Review of Systems   Constitutional:  "Negative for fever, chills, weight loss, malaise, and fatigue.   HENT: Negative for ear pain, nosebleeds, congestion, sore throat and neck pain.    Eyes: Negative for vision changes.   Neuro: Negative for headache, sensory changes, weakness, seizure, LOC.   Cardiovascular: Negative for chest pain, palpitations, orthopnea and leg swelling.   Respiratory: Positive for cough, sputum production, shortness of breath and wheezing.   Gastrointestinal: Negative for abdominal pain, nausea, vomiting or diarrhea.   Genitourinary: Negative for dysuria, urgency and frequency.  Musculoskeletal: Negative for falls, neck pain, back pain, joint pain, myalgias.   Skin: Positive for rash.  Negative for diaphoresis.     Exam:  BP (!) 162/118   Pulse (!) 119   Temp 36.2 °C (97.1 °F) (Temporal)   Resp (!) 22   Ht 1.651 m (5' 5\")   Wt 98.4 kg (217 lb)   SpO2 99%   General: well-nourished, well-developed female in NAD  Head: normocephalic, atraumatic  Eyes: PERRLA, no conjunctival injection, acuity grossly intact, lids normal.  Ears: normal shape and symmetry, no tenderness, no discharge. External canals are without any significant edema or erythema. Tympanic membranes are without any inflammation, no effusion. Gross auditory acuity is intact.  Nose: symmetrical without tenderness, clear discharge.  Mouth/Throat: reasonable hygiene, no erythema, exudates or tonsillar enlargement.  Neck: no masses, range of motion within normal limits, no tracheal deviation. No obvious thyroid enlargement.   Lymph: no cervical adenopathy. No supraclavicular adenopathy.   Neuro: alert and oriented. Cranial nerves 1-12 grossly intact. No sensory deficit.   Cardiovascular: regular rate and rhythm. No edema.  Pulmonary: no distress. Chest is symmetrical with respiration, no wheezes. Scattered wheezes.   Musculoskeletal: no clubbing, appropriate muscle tone, gait is stable.  Skin: warm, dry, intact, no clubbing, no cyanosis.  Scattered rash to right side " of abdomen and right flank, erythematous, maculopapular.  Psych: appropriate mood, affect, judgement.         Assessment/Plan:  1. Asthma with COPD with exacerbation (HCC)  methylPREDNISolone sod succ (SOLU-MEDROL) 125 MG injection 125 mg    predniSONE (DELTASONE) 10 MG Tab    albuterol (PROVENTIL) 2.5mg/3ml Nebu Soln solution for nebulization    doxycycline (VIBRAMYCIN) 100 MG Tab    benzonatate (TESSALON) 100 MG Cap   2. Acute dermatitis  triamcinolone acetonide (KENALOG) 0.1 % Ointment       Patient had her own albuterol in her purse, she administered a dose while in clinic, vitals improved with respiratory rate of 20, pulse ox of 96% and heart rate of 102, less wheezing.  Suspect asthma and COPD exacerbation, Solu-Medrol administered in clinic, tolerated well.  Prednisone and doxycycline at home.  Albuterol nebulizer refill provided.  Tessalon as needed for cough.  Furthermore patient will be given topical Kenalog for abdominal rash.  Blood pressure is found to be elevated today, I have instructed the patient to take her blood pressure medication as directed, she will go home and take her medicine immediately.  Supportive care, differential diagnoses, and indications for immediate follow-up discussed with patient and daughter.   Pathogenesis of diagnosis discussed including typical length and natural progression.   Instructed to return to clinic or nearest emergency department for any change in condition, further concerns, or worsening of symptoms.  Patient and daughter state understanding of the plan of care and discharge instructions.  Instructed to make an appointment, for follow up, with her primary care provider.        Please note that this dictation was created using voice recognition software. I have made every reasonable attempt to correct obvious errors, but I expect that there are errors of grammar and possibly content that I did not discover before finalizing the note.      PATSY Gregg.

## 2020-06-17 RX ORDER — ALBUTEROL SULFATE 2.5 MG/3ML
2.5 SOLUTION RESPIRATORY (INHALATION) EVERY 4 HOURS PRN
Qty: 30 BULLET | Refills: 2 | Status: SHIPPED | OUTPATIENT
Start: 2020-06-17 | End: 2020-09-22

## 2020-06-17 RX ORDER — ALBUTEROL SULFATE 90 UG/1
AEROSOL, METERED RESPIRATORY (INHALATION)
Qty: 54 G | Refills: 1 | Status: SHIPPED | OUTPATIENT
Start: 2020-06-17 | End: 2020-08-21

## 2020-08-20 DIAGNOSIS — R06.2 WHEEZING: ICD-10-CM

## 2020-08-21 RX ORDER — ALBUTEROL SULFATE 90 UG/1
AEROSOL, METERED RESPIRATORY (INHALATION)
Qty: 72 G | Refills: 3 | Status: SHIPPED | OUTPATIENT
Start: 2020-08-21 | End: 2021-11-17

## 2020-09-22 ENCOUNTER — OFFICE VISIT (OUTPATIENT)
Dept: URGENT CARE | Facility: PHYSICIAN GROUP | Age: 70
End: 2020-09-22
Payer: MEDICARE

## 2020-09-22 VITALS
TEMPERATURE: 98 F | OXYGEN SATURATION: 95 % | BODY MASS INDEX: 32.44 KG/M2 | HEART RATE: 100 BPM | SYSTOLIC BLOOD PRESSURE: 148 MMHG | RESPIRATION RATE: 22 BRPM | DIASTOLIC BLOOD PRESSURE: 92 MMHG | HEIGHT: 64 IN | WEIGHT: 190 LBS

## 2020-09-22 DIAGNOSIS — R06.2 WHEEZING: ICD-10-CM

## 2020-09-22 PROCEDURE — 99214 OFFICE O/P EST MOD 30 MIN: CPT | Performed by: FAMILY MEDICINE

## 2020-09-22 RX ORDER — ALBUTEROL SULFATE 2.5 MG/3ML
2.5 SOLUTION RESPIRATORY (INHALATION) EVERY 6 HOURS PRN
Qty: 30 BULLET | Refills: 2 | Status: SHIPPED | OUTPATIENT
Start: 2020-09-22 | End: 2022-11-28 | Stop reason: SDUPTHER

## 2020-09-22 RX ORDER — METHYLPREDNISOLONE SODIUM SUCCINATE 125 MG/2ML
90 INJECTION, POWDER, LYOPHILIZED, FOR SOLUTION INTRAMUSCULAR; INTRAVENOUS ONCE
Status: COMPLETED | OUTPATIENT
Start: 2020-09-22 | End: 2020-09-22

## 2020-09-22 RX ORDER — PREDNISONE 20 MG/1
TABLET ORAL
Qty: 12 TAB | Refills: 0 | Status: SHIPPED | OUTPATIENT
Start: 2020-09-22 | End: 2020-10-03

## 2020-09-22 RX ORDER — MONTELUKAST SODIUM 10 MG/1
10 TABLET ORAL EVERY EVENING
Qty: 30 TAB | Refills: 2 | Status: SHIPPED | OUTPATIENT
Start: 2020-09-22 | End: 2020-10-03

## 2020-09-22 RX ADMIN — METHYLPREDNISOLONE SODIUM SUCCINATE 90 MG: 125 INJECTION, POWDER, LYOPHILIZED, FOR SOLUTION INTRAMUSCULAR; INTRAVENOUS at 11:19

## 2020-09-22 ASSESSMENT — ENCOUNTER SYMPTOMS: WHEEZING: 1

## 2020-09-22 NOTE — PROGRESS NOTES
Subjective:      Michell Moon is a 70 y.o. female who presents with Shortness of Breath (at night time x1wk)      - This is a pleasant, well nourished and nontoxic appearing 70 y.o. female with c/o asthma/wheezing acting up for past month and out of albuterol for her home nebulizer. Usually worse at night. Albuterol helps. No NVFC/cp/sob.             ALLERGIES:  Pollen extract and Atorvastatin     PMH:  Past Medical History:   Diagnosis Date   • Asthma    • Bronchitis    • Chickenpox    • COPD (chronic obstructive pulmonary disease) (HCC)    • Hypertension    • Mumps    • Osteoporosis    • Pulmonary emphysema (HCC)         PSH:  Past Surgical History:   Procedure Laterality Date   • ARTHROPLASTY      hip r   • HIP REPLACEMENT, TOTAL     • HYSTERECTOMY LAPAROSCOPY         MEDS:    Current Outpatient Medications:   •  predniSONE (DELTASONE) 20 MG Tab, 1 tab BID x 6 days, Disp: 12 Tab, Rfl: 0  •  montelukast (SINGULAIR) 10 MG Tab, Take 1 Tab by mouth every evening., Disp: 30 Tab, Rfl: 2  •  albuterol (PROVENTIL) 2.5mg/3ml Nebu Soln solution for nebulization, 3 mL by Nebulization route every 6 hours as needed for Shortness of Breath., Disp: 30 Bullet, Rfl: 2  •  albuterol (VENTOLIN HFA) 108 (90 Base) MCG/ACT Aero Soln inhalation aerosol, USE 2 INHALATIONS BY MOUTH  EVERY 6 HOURS AS NEEDED FOR SHORTNESS OF BREATH, Disp: 72 g, Rfl: 3  •  hydroCHLOROthiazide (HYDRODIURIL) 25 MG Tab, TAKE 1 TABLET BY MOUTH  EVERY DAY, Disp: 90 Tab, Rfl: 3  •  traZODone (DESYREL) 50 MG Tab, , Disp: , Rfl:     Current Facility-Administered Medications:   •  methylPREDNISolone sod succ (SOLU-MEDROL) 125 MG injection 90 mg, 90 mg, Intramuscular, Once, Ankit Whyte M.D.    ** I have documented what I find to be significant in regards to past medical, social, family and surgical history  in my HPI or under PMH/PSH/FH review section, otherwise it is contributory **           HPI    Review of Systems   Respiratory: Positive for wheezing.   "  All other systems reviewed and are negative.         Objective:     /92   Pulse 100   Temp 36.7 °C (98 °F) (Temporal)   Resp (!) 22   Ht 1.626 m (5' 4\")   Wt 86.2 kg (190 lb)   SpO2 95%   BMI 32.61 kg/m²      Physical Exam  Vitals signs and nursing note reviewed.   Constitutional:       General: She is not in acute distress.     Appearance: She is well-developed. She is not diaphoretic.   HENT:      Head: Normocephalic and atraumatic.      Mouth/Throat:      Mouth: Mucous membranes are moist.      Pharynx: Oropharynx is clear.   Eyes:      General: No scleral icterus.     Conjunctiva/sclera: Conjunctivae normal.   Cardiovascular:      Heart sounds: Normal heart sounds. No murmur.   Pulmonary:      Effort: Pulmonary effort is normal. No respiratory distress.      Breath sounds: Wheezing present.   Skin:     Coloration: Skin is not pale.      Findings: No rash.   Neurological:      Mental Status: She is alert.      Motor: No abnormal muscle tone.   Psychiatric:         Mood and Affect: Mood normal.         Behavior: Behavior normal.         Judgment: Judgment normal.                 Assessment/Plan:            1. Wheezing  methylPREDNISolone sod succ (SOLU-MEDROL) 125 MG injection 90 mg    predniSONE (DELTASONE) 20 MG Tab    montelukast (SINGULAIR) 10 MG Tab    albuterol (PROVENTIL) 2.5mg/3ml Nebu Soln solution for nebulization         - Dx & d/c instructions discussed w/ patient and/or family members.   - rest/hydrate  - E.R. precautions discussed       Follow up with their PCP in 2-3 days strongly advised, ER if not improving or feeling/getting worse.    Any realistic side effects of medications that may have been given today (i.e. Rash, GI upset/constipation, sedation, elevation of BP or blood sugars) discussed.     Patient left in stable condition      reviewed if narcotics given            "

## 2020-10-03 ENCOUNTER — OFFICE VISIT (OUTPATIENT)
Dept: URGENT CARE | Facility: CLINIC | Age: 70
End: 2020-10-03
Payer: MEDICARE

## 2020-10-03 VITALS
OXYGEN SATURATION: 99 % | TEMPERATURE: 97.7 F | RESPIRATION RATE: 16 BRPM | SYSTOLIC BLOOD PRESSURE: 150 MMHG | BODY MASS INDEX: 35 KG/M2 | WEIGHT: 205 LBS | DIASTOLIC BLOOD PRESSURE: 98 MMHG | HEART RATE: 78 BPM | HEIGHT: 64 IN

## 2020-10-03 DIAGNOSIS — K59.00 CONSTIPATION, UNSPECIFIED CONSTIPATION TYPE: ICD-10-CM

## 2020-10-03 DIAGNOSIS — K64.9 HEMORRHOIDS, UNSPECIFIED HEMORRHOID TYPE: ICD-10-CM

## 2020-10-03 PROCEDURE — 99214 OFFICE O/P EST MOD 30 MIN: CPT | Performed by: NURSE PRACTITIONER

## 2020-10-03 RX ORDER — HYDROCORTISONE 25 MG/G
1 CREAM TOPICAL 2 TIMES DAILY
Qty: 28 G | Refills: 0 | Status: SHIPPED | OUTPATIENT
Start: 2020-10-03

## 2020-10-03 ASSESSMENT — ENCOUNTER SYMPTOMS: CONSTIPATION: 1

## 2020-10-03 NOTE — PATIENT INSTRUCTIONS
Hemorrhoids    Hemorrhoids are dilated veins that are caused by increased pressure in the rectum.  This is a common cause of rectal bleeding.  Usually conservative treatment helps with resolution.  Occasionally surgical evaluation may be necessary.    Home Care Instructions:    *Sitz baths- sit in warm bath with butt submerged 20 minutes 2-3x/day.  This allows for the cleansing, softening and resolution of the hemlorrhoids.    *Anusol HC 2.5% Cream (prescription) -apply 3x/day. This helps to decrease inflammation and pain.    *Xylocaine Jelly 2% (prescription)- apply 4-5x/day as needed.  This helps with the pain.    *Tucks or Preparation H- to keep area clean and avoid infection.    *Stool Softners such as [id number] one tablet twice a day. Metamucil 1 tablespoon daily.  This avoids straining and irritation of the hemorrhoids.  Also proper fluid intake helps with constipation.    par *Donut pillow to avoid presure on the hemorrhoids.    It's important to follow-up with your primary care physician to ensure proper resolution of the hemorrhoids.  If you become lightheaded, short of breath, or feel as if you're going to faint, you may be losing too much blood and should go to the emergency room.

## 2020-10-03 NOTE — LETTER
October 3, 2020            Hemorrhoids    Hemorrhoids are dilated veins that are caused by increased pressure in the rectum.  This is a common cause of rectal bleeding.  Usually conservative treatment helps with resolution.  Occasionally surgical evaluation may be necessary.    Home Care Instructions:    *Sitz baths- sit in warm bath with butt submerged 20 minutes 2-3x/day.  This allows for the cleansing, softening and resolution of the hemlorrhoids.    *Anusol HC 2.5% Cream (prescription) -apply 3x/day. This helps to decrease inflammation and pain.    *Tucks or Preparation H- to keep area clean and avoid infection.    *Stool Softners such as [id number] one tablet twice a day.  Metamucil- start 1 tablespoon daily and may increase to 2-3x day.  This avoids straining and irritation of the hemorrhoids.  Also proper fluid intake helps with constipation.       *Donut pillow to avoid presure on the hemorrhoids.    It's important to follow-up with your primary care physician to ensure proper resolution of the hemorrhoids.  If you become lightheaded, short of breath, or feel as if you're going to faint, you may be losing too much blood and should go to the emergency room.

## 2020-10-03 NOTE — PROGRESS NOTES
Subjective:      Michell Moon is a 70 y.o. female who presents with Hemorrhoids (bleeding x2 days ) and Constipation (x2 days )            Patient presents to  with complaint of bright red blood per rectum.  She has known hemorrhoids and states she has recently moved and has not been good about her diet and has become significantly constipated.  She denies and SOB, lightheadness, palpitations, chest pain, n/v.  Patient is up to date on colonoscopy.    Hemorrhoids  This is a new problem. The current episode started yesterday. The problem occurs constantly. The problem has been gradually worsening. Associated symptoms include a change in bowel habit ( Constipation). Pertinent negatives include no abdominal pain, anorexia, chest pain, chills, coughing, fatigue, fever, headaches, myalgias, nausea, rash, sore throat or vomiting. Exacerbated by: Sitting. Treatments tried: Glycerin suppository, OTC hemorrhoid cream. The treatment provided no relief.   Constipation  This is a new problem. The current episode started in the past 7 days. The problem has been gradually worsening since onset. The stool is described as firm. The patient is not on a high fiber diet. She does not exercise regularly. There has not been adequate water intake. Associated symptoms include hemorrhoids. Pertinent negatives include no abdominal pain, anorexia, back pain, diarrhea, fever, melena, nausea or vomiting. Risk factors include obesity, stress and dietary change. She has tried laxatives and stool softeners for the symptoms. The treatment provided mild relief.       Review of Systems   Constitutional: Negative for chills, fatigue, fever and malaise/fatigue.   HENT: Negative for ear pain and sore throat.    Respiratory: Negative for cough, shortness of breath and wheezing.    Cardiovascular: Negative for chest pain, palpitations, orthopnea and leg swelling.   Gastrointestinal: Positive for blood in stool, change in bowel habit ( Constipation),  "constipation and hemorrhoids. Negative for abdominal pain, anorexia, diarrhea, heartburn, melena, nausea and vomiting.   Musculoskeletal: Negative for back pain, joint pain and myalgias.   Skin: Negative for rash.   Neurological: Negative for dizziness, tingling and headaches.   Psychiatric/Behavioral: Negative for memory loss and suicidal ideas.   All other systems reviewed and are negative.         Objective:     /98   Pulse 78   Temp 36.5 °C (97.7 °F) (Temporal)   Resp 16   Ht 1.626 m (5' 4\")   Wt 93 kg (205 lb)   SpO2 99%   BMI 35.19 kg/m²      Physical Exam  Vitals signs reviewed.   Constitutional:       General: She is not in acute distress.     Appearance: Normal appearance. She is not ill-appearing, toxic-appearing or diaphoretic.   HENT:      Head: Normocephalic.      Right Ear: External ear normal.      Left Ear: External ear normal.      Nose: Nose normal. No congestion.      Mouth/Throat:      Mouth: Mucous membranes are moist.   Eyes:      Extraocular Movements: Extraocular movements intact.      Conjunctiva/sclera: Conjunctivae normal.      Pupils: Pupils are equal, round, and reactive to light.   Neck:      Musculoskeletal: Normal range of motion and neck supple.   Cardiovascular:      Rate and Rhythm: Normal rate and regular rhythm.      Pulses: Normal pulses.      Heart sounds: Normal heart sounds. No murmur.   Pulmonary:      Effort: Pulmonary effort is normal. No respiratory distress.      Breath sounds: Normal breath sounds. No wheezing, rhonchi or rales.   Abdominal:      General: Abdomen is protuberant. Bowel sounds are normal. There is no distension.      Palpations: Abdomen is soft.      Tenderness: There is no abdominal tenderness. There is no guarding.   Genitourinary:     Rectum: Tenderness and external hemorrhoid present. No mass or anal fissure. Normal anal tone.       Musculoskeletal: Normal range of motion.   Lymphadenopathy:      Cervical: No cervical adenopathy. "   Skin:     General: Skin is warm and dry.      Capillary Refill: Capillary refill takes less than 2 seconds.   Neurological:      Mental Status: She is alert and oriented to person, place, and time.   Psychiatric:         Mood and Affect: Mood normal.         Behavior: Behavior normal.                 Assessment/Plan:        1. Hemorrhoids, unspecified hemorrhoid type  hydrocortisone rectal (PERIANAL) 2.5% Cream   2. Constipation, unspecified constipation type       Discussed bowel protocol in detail. Patient to try conservative measures and will f/u with GI if symptoms do not improve    Plan of care, medications and treatments reviewed with patient and or guardian.  Patient and or guardian voices understanding and agrees with the instructions provided. After visit summary reviewed with patient. Patient and or guardian understand the parameters for reevaluation and ER precautions discussed.     Follow up with primary care provider in the next 1-5 days for recheck as needed.  Discussed that urgent care setting has limited resources, therefore any worsening of symptoms should be evaluated in the ER. Patient and or guardian verbalized understanding.     Please note that this dictation was created using voice recognition software. I have made every reasonable attempt to correct obvious errors, but I expect that there are errors of grammar and possibly content that I did not discover before finalizing the note.

## 2020-10-04 ASSESSMENT — ENCOUNTER SYMPTOMS
FATIGUE: 0
COUGH: 0
CHILLS: 0
FEVER: 0
BACK PAIN: 0
ORTHOPNEA: 0
SHORTNESS OF BREATH: 0
SORE THROAT: 0
WHEEZING: 0
MEMORY LOSS: 0
NAUSEA: 0
PALPITATIONS: 0
BLOOD IN STOOL: 1
MYALGIAS: 0
HEARTBURN: 0
VOMITING: 0
HEADACHES: 0
TINGLING: 0
ANOREXIA: 0
DIZZINESS: 0
ABDOMINAL PAIN: 0
CHANGE IN BOWEL HABIT: 1
DIARRHEA: 0

## 2021-03-03 DIAGNOSIS — Z23 NEED FOR VACCINATION: ICD-10-CM

## 2021-03-31 ENCOUNTER — TELEMEDICINE (OUTPATIENT)
Dept: MEDICAL GROUP | Facility: PHYSICIAN GROUP | Age: 71
End: 2021-03-31
Payer: MEDICARE

## 2021-03-31 VITALS — WEIGHT: 140 LBS | TEMPERATURE: 97.6 F | BODY MASS INDEX: 23.9 KG/M2 | HEIGHT: 64 IN

## 2021-03-31 DIAGNOSIS — Z12.12 SCREENING FOR COLORECTAL CANCER: ICD-10-CM

## 2021-03-31 DIAGNOSIS — I10 ESSENTIAL HYPERTENSION: ICD-10-CM

## 2021-03-31 DIAGNOSIS — I10 BENIGN ESSENTIAL HTN: ICD-10-CM

## 2021-03-31 DIAGNOSIS — Z00.00 MEDICARE ANNUAL WELLNESS VISIT, SUBSEQUENT: ICD-10-CM

## 2021-03-31 DIAGNOSIS — Z12.31 ENCOUNTER FOR SCREENING MAMMOGRAM FOR BREAST CANCER: ICD-10-CM

## 2021-03-31 DIAGNOSIS — Z12.11 SCREENING FOR COLORECTAL CANCER: ICD-10-CM

## 2021-03-31 DIAGNOSIS — J45.20 MILD INTERMITTENT ASTHMA WITHOUT COMPLICATION: ICD-10-CM

## 2021-03-31 PROCEDURE — G0439 PPPS, SUBSEQ VISIT: HCPCS | Mod: 95,CR | Performed by: FAMILY MEDICINE

## 2021-03-31 RX ORDER — FLUTICASONE PROPIONATE 50 MCG
1 SPRAY, SUSPENSION (ML) NASAL DAILY
Qty: 16 G | Refills: 3 | Status: SHIPPED | OUTPATIENT
Start: 2021-03-31 | End: 2021-06-17 | Stop reason: SDUPTHER

## 2021-03-31 ASSESSMENT — ENCOUNTER SYMPTOMS: GENERAL WELL-BEING: EXCELLENT

## 2021-03-31 ASSESSMENT — PATIENT HEALTH QUESTIONNAIRE - PHQ9: CLINICAL INTERPRETATION OF PHQ2 SCORE: 0

## 2021-03-31 ASSESSMENT — ACTIVITIES OF DAILY LIVING (ADL): BATHING_REQUIRES_ASSISTANCE: 0

## 2021-03-31 NOTE — PROGRESS NOTES
Chief Complaint   Patient presents with   • Annual Exam     Virtual Visit: Established Patient   This visit was conducted via Zoom using secure and encrypted videoconferencing technology. The patient was in a private location in the state of [place].    The patient's identity was confirmed and verbal consent was obtained for this virtual visit.    Subjective:   CC:   Chief Complaint   Patient presents with   • Annual Exam       Michell Moon is a 71 y.o. female presenting for evaluation and management of:      ROS   Denies any recent fevers or chills. No nausea or vomiting. No chest pains or shortness of breath.     Allergies   Allergen Reactions   • Pollen Extract      Sob,itching,lack of sleep   • Atorvastatin Vomiting       Current medicines (including changes today)  Current Outpatient Medications   Medication Sig Dispense Refill   • Fluticasone Furoate-Vilanterol (BREO ELLIPTA) 100-25 MCG/INH AEROSOL POWDER, BREATH ACTIVATED Inhale 1 Puff.     • fluticasone (FLONASE) 50 MCG/ACT nasal spray Administer 1 Spray into affected nostril(S) every day. 16 g 3   • hydroCHLOROthiazide (HYDRODIURIL) 25 MG Tab TAKE 1 TABLET BY MOUTH  EVERY DAY 90 Tab 3   • hydrocortisone rectal (PERIANAL) 2.5% Cream Insert 1 Application in rectum 2 times a day. 28 g 0   • albuterol (PROVENTIL) 2.5mg/3ml Nebu Soln solution for nebulization 3 mL by Nebulization route every 6 hours as needed for Shortness of Breath. (Patient not taking: Reported on 3/31/2021) 30 Bullet 2   • albuterol (VENTOLIN HFA) 108 (90 Base) MCG/ACT Aero Soln inhalation aerosol USE 2 INHALATIONS BY MOUTH  EVERY 6 HOURS AS NEEDED FOR SHORTNESS OF BREATH (Patient not taking: Reported on 3/31/2021) 72 g 3   • traZODone (DESYREL) 50 MG Tab        No current facility-administered medications for this visit.       Patient Active Problem List    Diagnosis Date Noted   • Localized osteoarthritis of left knee 07/31/2019   • Obesity (BMI 30-39.9) 06/18/2018   • Benign essential HTN  "03/19/2018   • Mild intermittent asthma without complication 03/19/2018   • Hyperlipidemia 12/12/2017   • Screening for HIV (human immunodeficiency virus) 06/27/2016   • BMI 38.0-38.9,adult 06/27/2016   • Uncomplicated asthma 10/06/2015   • Essential hypertension 10/06/2015       Family History   Problem Relation Age of Onset   • Prostate cancer Father        She  has a past medical history of Asthma, Bronchitis, Chickenpox, COPD (chronic obstructive pulmonary disease) (HCC), Hypertension, Mumps, Osteoporosis, and Pulmonary emphysema (HCC).  She  has a past surgical history that includes arthroplasty; hip replacement, total; and hysterectomy laparoscopy.       Objective:   Temp 36.4 °C (97.6 °F) (Oral)   Ht 1.626 m (5' 4\")   Wt 63.5 kg (140 lb)   BMI 24.03 kg/m²     Physical Exam:  Constitutional: Alert, no distress, well-groomed.  Skin: No rashes in visible areas.  Eye: Round. Conjunctiva clear, lids normal. No icterus.   ENMT: Lips pink without lesions, good dentition, moist mucous membranes. Phonation normal.  Neck: No masses, no thyromegaly. Moves freely without pain.  Respiratory: Unlabored respiratory effort, no cough or audible wheeze  Psych: Alert and oriented x3, normal affect and mood.       Assessment and Plan:   The following treatment plan was discussed:     1. Medicare annual wellness visit, subsequent    2. Encounter for screening mammogram for breast cancer  - MA-SCREENING MAMMO BILAT W/CAD; Future    3. Screening for colorectal cancer  - OCCULT BLOOD FECES IMMUNOASSAY (FIT); Future    4. Mild intermittent asthma without complication    5. Benign essential HTN    6. Essential hypertension    Other orders  - Fluticasone Furoate-Vilanterol (BREO ELLIPTA) 100-25 MCG/INH AEROSOL POWDER, BREATH ACTIVATED; Inhale 1 Puff.  - fluticasone (FLONASE) 50 MCG/ACT nasal spray; Administer 1 Spray into affected nostril(S) every day.  Dispense: 16 g; Refill: 3        Follow-up: No follow-ups on file.     "     HPI:  Michell Moon is a 71 y.o. here for Medicare Annual Wellness Visit     Patient Active Problem List    Diagnosis Date Noted   • Localized osteoarthritis of left knee 07/31/2019   • Obesity (BMI 30-39.9) 06/18/2018   • Benign essential HTN 03/19/2018   • Mild intermittent asthma without complication 03/19/2018   • Hyperlipidemia 12/12/2017   • Screening for HIV (human immunodeficiency virus) 06/27/2016   • BMI 38.0-38.9,adult 06/27/2016   • Uncomplicated asthma 10/06/2015   • Essential hypertension 10/06/2015       Current Outpatient Medications   Medication Sig Dispense Refill   • Fluticasone Furoate-Vilanterol (BREO ELLIPTA) 100-25 MCG/INH AEROSOL POWDER, BREATH ACTIVATED Inhale 1 Puff.     • fluticasone (FLONASE) 50 MCG/ACT nasal spray Administer 1 Spray into affected nostril(S) every day. 16 g 3   • hydroCHLOROthiazide (HYDRODIURIL) 25 MG Tab TAKE 1 TABLET BY MOUTH  EVERY DAY 90 Tab 3   • hydrocortisone rectal (PERIANAL) 2.5% Cream Insert 1 Application in rectum 2 times a day. 28 g 0   • albuterol (PROVENTIL) 2.5mg/3ml Nebu Soln solution for nebulization 3 mL by Nebulization route every 6 hours as needed for Shortness of Breath. (Patient not taking: Reported on 3/31/2021) 30 Bullet 2   • albuterol (VENTOLIN HFA) 108 (90 Base) MCG/ACT Aero Soln inhalation aerosol USE 2 INHALATIONS BY MOUTH  EVERY 6 HOURS AS NEEDED FOR SHORTNESS OF BREATH (Patient not taking: Reported on 3/31/2021) 72 g 3   • traZODone (DESYREL) 50 MG Tab        No current facility-administered medications for this visit.          Current supplements as per medication list.     Allergies: Pollen extract and Atorvastatin    Current social contact/activities:      She  reports that she quit smoking about 10 years ago. Her smoking use included cigarettes. She has a 1.00 pack-year smoking history. She has never used smokeless tobacco. She reports current alcohol use. She reports that she does not use drugs.  Counseling given: Not  Answered  Comment: continued abstinance      DPA/Advanced Directive:  Patient does not have an Advanced Directive.  A packet and workshop information was given on Advanced Directives.    ROS:    Gait: Uses no assistive device  Ostomy: No  Other tubes: No  Amputations: No  Chronic oxygen use: No  Last eye exam: 2019  Wears hearing aids: No   : Denies any urinary leakage during the last 6 months    Screening:    Depression Screening    Little interest or pleasure in doing things?  0 - not at all  Feeling down, depressed , or hopeless? 0 - not at all  Patient Health Questionnaire Score: 0     If depressive symptoms identified deferred to follow up visit unless specifically addressed in assessment and plan.    Interpretation of PHQ-9 Total Score   Score Severity   1-4 No Depression   5-9 Mild Depression   10-14 Moderate Depression   15-19 Moderately Severe Depression   20-27 Severe Depression    Screening for Cognitive Impairment    Three Minute Recall (captain, garden, picture) 3/3    Bernardo clock face with all 12 numbers and set the hands to show 5 past 8.  Yes    Cognitive concerns identified deferred for follow up unless specifically addressed in assessment and plan.    Fall Risk Assessment    Has the patient had two or more falls in the last year or any fall with injury in the last year?  No    Safety Assessment    Throw rugs on floor.  Yes  Handrails on all stairs.  Yes  Good lighting in all hallways.  Yes  Difficulty hearing.  No  Patient counseled about all safety risks that were identified.    Functional Assessment ADLs    Are there any barriers preventing you from cooking for yourself or meeting nutritional needs?  No.    Are there any barriers preventing you from driving safely or obtaining transportation?  No.    Are there any barriers preventing you from using a telephone or calling for help?  No.    Are there any barriers preventing you from shopping?  No.    Are there any barriers preventing you from  taking care of your own finances?  No.    Are there any barriers preventing you from managing your medications?  No.    Are there any barriers preventing you from showering, bathing or dressing yourself?  No.    Are you currently engaging in any exercise or physical activity?  No.     What is your perception of your health?  Excellent.      Health Maintenance Summary                IMM ZOSTER VACCINES Overdue 3/28/2002     IMM PNEUMOCOCCAL VACCINE: 65+ Years Overdue 12/9/2017      Done 12/9/2016 Imm Admin: Pneumococcal Conjugate Vaccine (Prevnar/PCV-13)    COLON CANCER SCREENING ANNUAL FIT Overdue 8/27/2019      Done 8/27/2018 OCCULT BLOOD FECES IMMUNOASSAY    Annual Pulmonary Function Test / Spirometry Overdue 9/10/2019      Done 9/10/2018 AMB PULMONARY FUNCTION TEST/LAB    MAMMOGRAM Overdue 8/12/2020      Done 8/12/2019 MA-SCREENING MAMMO BILAT W/TOMOSYNTHESIS W/CAD     Patient has more history with this topic...    IMM INFLUENZA Overdue 9/1/2020      Done 10/11/2019 Ext Imm: Influenza, High Dose     Patient has more history with this topic...    BONE DENSITY Next Due 7/18/2023      Done 7/18/2018 DS-BONE DENSITY STUDY (DEXA)    IMM DTaP/Tdap/Td Vaccine Next Due 6/27/2026      Done 6/27/2016 Imm Admin: Tdap Vaccine          Patient Care Team:  Luiz Perry M.D. as PCP - General (Family Medicine)        Social History     Tobacco Use   • Smoking status: Former Smoker     Packs/day: 0.25     Years: 4.00     Pack years: 1.00     Types: Cigarettes     Quit date: 3/1/2011     Years since quitting: 10.0   • Smokeless tobacco: Never Used   • Tobacco comment: continued abstinance   Substance Use Topics   • Alcohol use: Yes     Comment: 3 drinks a week   • Drug use: No     Family History   Problem Relation Age of Onset   • Prostate cancer Father      She  has a past medical history of Asthma, Bronchitis, Chickenpox, COPD (chronic obstructive pulmonary disease) (HCC), Hypertension, Mumps, Osteoporosis, and Pulmonary  "emphysema (HCC).   Past Surgical History:   Procedure Laterality Date   • ARTHROPLASTY      hip r   • HIP REPLACEMENT, TOTAL     • HYSTERECTOMY LAPAROSCOPY         Exam:   Temp 36.4 °C (97.6 °F) (Oral)   Ht 1.626 m (5' 4\")   Wt 63.5 kg (140 lb)  Body mass index is 24.03 kg/m².    Hearing good.    Dentition fair  Alert, oriented in no acute distress.  Eye contact is good, speech goal directed, affect calm    Assessment and Plan. The following treatment and monitoring plan is recommended:    1. Encounter for screening mammogram for breast cancer  - MA-SCREENING MAMMO BILAT W/CAD; Future    2. Screening for colorectal cancer  - OCCULT BLOOD FECES IMMUNOASSAY (FIT); Future    Other orders  - Fluticasone Furoate-Vilanterol (BREO ELLIPTA) 100-25 MCG/INH AEROSOL POWDER, BREATH ACTIVATED; Inhale 1 Puff.  - fluticasone (FLONASE) 50 MCG/ACT nasal spray; Administer 1 Spray into affected nostril(S) every day.  Dispense: 16 g; Refill: 3    1. Medicare annual wellness visit, subsequent      2. Encounter for screening mammogram for breast cancer    - MA-SCREENING MAMMO BILAT W/CAD; Future    3. Screening for colorectal cancer    - OCCULT BLOOD FECES IMMUNOASSAY (FIT); Future    4. Mild intermittent asthma without complication  The patient's current medical issue is well controlled on the current therapy with no new symptoms or worsening      5. Benign essential HTN  Currently treated for HTN, taking meds with no CP or sob, monitors bp at home periodically. controlled        6. Essential hypertension  Currently treated for HTN, taking meds with no CP or sob, monitors bp at home periodically. controlled        Past Medical History:   Diagnosis Date   • Asthma    • Bronchitis    • Chickenpox    • COPD (chronic obstructive pulmonary disease) (HCC)    • Hypertension    • Mumps    • Osteoporosis    • Pulmonary emphysema (HCC)      Past Surgical History:   Procedure Laterality Date   • ARTHROPLASTY      hip r   • HIP REPLACEMENT, TOTAL "     • HYSTERECTOMY LAPAROSCOPY       Social History     Tobacco Use   • Smoking status: Former Smoker     Packs/day: 0.25     Years: 4.00     Pack years: 1.00     Types: Cigarettes     Quit date: 3/1/2011     Years since quitting: 10.0   • Smokeless tobacco: Never Used   • Tobacco comment: continued abstinance   Substance Use Topics   • Alcohol use: Yes     Comment: 3 drinks a week   • Drug use: No     Family History   Problem Relation Age of Onset   • Prostate cancer Father          Current Outpatient Medications:   •  Fluticasone Furoate-Vilanterol (BREO ELLIPTA) 100-25 MCG/INH AEROSOL POWDER, BREATH ACTIVATED, Inhale 1 Puff., Disp: , Rfl:   •  fluticasone (FLONASE) 50 MCG/ACT nasal spray, Administer 1 Spray into affected nostril(S) every day., Disp: 16 g, Rfl: 3  •  hydroCHLOROthiazide (HYDRODIURIL) 25 MG Tab, TAKE 1 TABLET BY MOUTH  EVERY DAY, Disp: 90 Tab, Rfl: 3  •  hydrocortisone rectal (PERIANAL) 2.5% Cream, Insert 1 Application in rectum 2 times a day., Disp: 28 g, Rfl: 0  •  albuterol (PROVENTIL) 2.5mg/3ml Nebu Soln solution for nebulization, 3 mL by Nebulization route every 6 hours as needed for Shortness of Breath. (Patient not taking: Reported on 3/31/2021), Disp: 30 Bullet, Rfl: 2  •  albuterol (VENTOLIN HFA) 108 (90 Base) MCG/ACT Aero Soln inhalation aerosol, USE 2 INHALATIONS BY MOUTH  EVERY 6 HOURS AS NEEDED FOR SHORTNESS OF BREATH (Patient not taking: Reported on 3/31/2021), Disp: 72 g, Rfl: 3  •  traZODone (DESYREL) 50 MG Tab, , Disp: , Rfl:     Patient was instructed on the use of medications, either prescriptions or OTC and informed on when the appropriate follow up time period should be. In addition, patient was also instructed that should any acute worsening occur that they should notify this clinic asap or call 911.      Services suggested: No services needed at this time  Health Care Screening: Age-appropriate preventive services recommended by USPTF and ACIP covered by Medicare were  discussed today. Services ordered if indicated and agreed upon by the patient.  Referrals offered: Community-based lifestyle interventions to reduce health risks and promote self-management and wellness, fall prevention, nutrition, physical activity, tobacco-use cessation, weight loss, and mental health services as per orders if indicated.    Discussion today about general wellness and lifestyle habits:    · Prevent falls and reduce trip hazards; Cautioned about securing or removing rugs.  · Have a working fire alarm and carbon monoxide detector;   · Engage in regular physical activity and social activities     Follow-up: No follow-ups on file.

## 2021-03-31 NOTE — NON-PROVIDER
.virt  Chief Complaint   Patient presents with   • Annual Exam       HPI:  Michell Moon is a 71 y.o. here for Medicare Annual Wellness Visit     Patient Active Problem List    Diagnosis Date Noted   • Localized osteoarthritis of left knee 07/31/2019   • Obesity (BMI 30-39.9) 06/18/2018   • Benign essential HTN 03/19/2018   • Mild intermittent asthma without complication 03/19/2018   • Hyperlipidemia 12/12/2017   • Screening for HIV (human immunodeficiency virus) 06/27/2016   • BMI 38.0-38.9,adult 06/27/2016   • Uncomplicated asthma 10/06/2015   • Essential hypertension 10/06/2015       Current Outpatient Medications   Medication Sig Dispense Refill   • Fluticasone Furoate-Vilanterol (BREO ELLIPTA) 100-25 MCG/INH AEROSOL POWDER, BREATH ACTIVATED Inhale 1 Puff.     • hydroCHLOROthiazide (HYDRODIURIL) 25 MG Tab TAKE 1 TABLET BY MOUTH  EVERY DAY 90 Tab 3   • hydrocortisone rectal (PERIANAL) 2.5% Cream Insert 1 Application in rectum 2 times a day. 28 g 0   • albuterol (PROVENTIL) 2.5mg/3ml Nebu Soln solution for nebulization 3 mL by Nebulization route every 6 hours as needed for Shortness of Breath. (Patient not taking: Reported on 3/31/2021) 30 Bullet 2   • albuterol (VENTOLIN HFA) 108 (90 Base) MCG/ACT Aero Soln inhalation aerosol USE 2 INHALATIONS BY MOUTH  EVERY 6 HOURS AS NEEDED FOR SHORTNESS OF BREATH (Patient not taking: Reported on 3/31/2021) 72 g 3   • traZODone (DESYREL) 50 MG Tab        No current facility-administered medications for this visit.          Current supplements as per medication list.     Allergies: Pollen extract and Atorvastatin    Current social contact/activities:      She  reports that she quit smoking about 10 years ago. Her smoking use included cigarettes. She has a 1.00 pack-year smoking history. She has never used smokeless tobacco. She reports current alcohol use. She reports that she does not use drugs.  Counseling given: Not Answered  Comment: continued abstinance      DPA/Advanced  Directive:  Patient does not have an Advanced Directive.  A packet and workshop information was given on Advanced Directives.    ROS:    Gait: Uses no assistive device  Ostomy: No  Other tubes: No  Amputations: No  Chronic oxygen use: No  Last eye exam: 2019    Wears hearing aids: No   : Denies any urinary leakage during the last 6 months    Screening:    Depression Screening    Little interest or pleasure in doing things?  0 - not at all  Feeling down, depressed , or hopeless? 0 - not at all  Patient Health Questionnaire Score: 0     If depressive symptoms identified deferred to follow up visit unless specifically addressed in assessment and plan.    Interpretation of PHQ-9 Total Score   Score Severity   1-4 No Depression   5-9 Mild Depression   10-14 Moderate Depression   15-19 Moderately Severe Depression   20-27 Severe Depression    Screening for Cognitive Impairment    Three Minute Recall (captain, garden, picture) 3/3    Bernardo clock face with all 12 numbers and set the hands to show 5 past 8.  Yes    Cognitive concerns identified deferred for follow up unless specifically addressed in assessment and plan.    Fall Risk Assessment    Has the patient had two or more falls in the last year or any fall with injury in the last year?  No    Safety Assessment    Throw rugs on floor.  Yes  Handrails on all stairs.  Yes  Good lighting in all hallways.  Yes  Difficulty hearing.  No  Patient counseled about all safety risks that were identified.    Functional Assessment ADLs    Are there any barriers preventing you from cooking for yourself or meeting nutritional needs?  No.    Are there any barriers preventing you from driving safely or obtaining transportation?  No.    Are there any barriers preventing you from using a telephone or calling for help?  No.    Are there any barriers preventing you from shopping?  No.    Are there any barriers preventing you from taking care of your own finances?  No.    Are there any  barriers preventing you from managing your medications?  No.    Are there any barriers preventing you from showering, bathing or dressing yourself?  No.    Are you currently engaging in any exercise or physical activity?  No.     What is your perception of your health?  Excellent.      Health Maintenance Summary                Annual Wellness Visit Overdue 1950     HEPATITIS C SCREENING Overdue 1952     PAP SMEAR Overdue 3/28/1973     IMM ZOSTER VACCINES Overdue 3/28/2002     IMM PNEUMOCOCCAL VACCINE: 65+ Years Overdue 12/9/2017      Done 12/9/2016 Imm Admin: Pneumococcal Conjugate Vaccine (Prevnar/PCV-13)    COLON CANCER SCREENING ANNUAL FIT Overdue 8/27/2019      Done 8/27/2018 OCCULT BLOOD FECES IMMUNOASSAY    Annual Pulmonary Function Test / Spirometry Overdue 9/10/2019      Done 9/10/2018 AMB PULMONARY FUNCTION TEST/LAB    MAMMOGRAM Overdue 8/12/2020      Done 8/12/2019 MA-SCREENING MAMMO BILAT W/TOMOSYNTHESIS W/CAD     Patient has more history with this topic...    IMM INFLUENZA Overdue 9/1/2020      Done 10/11/2019 Ext Imm: Influenza, High Dose     Patient has more history with this topic...    BONE DENSITY Next Due 7/18/2023      Done 7/18/2018 DS-BONE DENSITY STUDY (DEXA)    IMM DTaP/Tdap/Td Vaccine Next Due 6/27/2026      Done 6/27/2016 Imm Admin: Tdap Vaccine          Patient Care Team:  Luiz Perry M.D. as PCP - General (Family Medicine)        Social History     Tobacco Use   • Smoking status: Former Smoker     Packs/day: 0.25     Years: 4.00     Pack years: 1.00     Types: Cigarettes     Quit date: 3/1/2011     Years since quitting: 10.0   • Smokeless tobacco: Never Used   • Tobacco comment: continued abstinance   Substance Use Topics   • Alcohol use: Yes     Comment: 3 drinks a week   • Drug use: No     Family History   Problem Relation Age of Onset   • Prostate cancer Father      She  has a past medical history of Asthma, Bronchitis, Chickenpox, COPD (chronic obstructive pulmonary  "disease) (HCC), Hypertension, Mumps, Osteoporosis, and Pulmonary emphysema (HCC).   Past Surgical History:   Procedure Laterality Date   • ARTHROPLASTY      hip r   • HIP REPLACEMENT, TOTAL     • HYSTERECTOMY LAPAROSCOPY         Exam:   Temp 36.4 °C (97.6 °F) (Oral)   Ht 1.626 m (5' 4\")   Wt 63.5 kg (140 lb)  Body mass index is 24.03 kg/m².    Hearing {GOOD/FAIR/POOR/EXCELLENT:11829}.    Dentition {DENTITION:57231}  Alert, oriented in no acute distress.  Eye contact is good, speech goal directed, affect calm    Assessment and Plan. The following treatment and monitoring plan is recommended:    There are no diagnoses linked to this encounter.    Services suggested: { AWV COORDINATION OF SERVICES:53212}  Health Care Screening: Age-appropriate preventive services recommended by USPTF and ACIP covered by Medicare were discussed today. Services ordered if indicated and agreed upon by the patient.  Referrals offered: Community-based lifestyle interventions to reduce health risks and promote self-management and wellness, fall prevention, nutrition, physical activity, tobacco-use cessation, weight loss, and mental health services as per orders if indicated.    Discussion today about general wellness and lifestyle habits:    · Prevent falls and reduce trip hazards; Cautioned about securing or removing rugs.  · Have a working fire alarm and carbon monoxide detector;   · Engage in regular physical activity and social activities     Follow-up: No follow-ups on file.    "

## 2021-04-13 DIAGNOSIS — I10 BENIGN ESSENTIAL HTN: ICD-10-CM

## 2021-04-13 RX ORDER — HYDROCHLOROTHIAZIDE 25 MG/1
TABLET ORAL
Qty: 90 TABLET | Refills: 3 | Status: SHIPPED | OUTPATIENT
Start: 2021-04-13 | End: 2022-02-22 | Stop reason: SDUPTHER

## 2021-04-15 NOTE — NON-PROVIDER
This evaluation was conducted via Zoom using secure and encrypted videoconferencing technology. The patient was in a private location in the [facility].    The patient's identity was confirmed and verbal consent was obtained for this virtual visit.  Depression Screening    Little interest or pleasure in doing things?  0 - not at all  Feeling down, depressed , or hopeless? 0 - not at all  Patient Health Questionnaire Score: 0     If depressive symptoms identified deferred to follow up visit unless specifically addressed in assessment and plan.    Interpretation of PHQ-9 Total Score   Score Severity   1-4 No Depression   5-9 Mild Depression   10-14 Moderate Depression   15-19 Moderately Severe Depression   20-27 Severe Depression    Screening for Cognitive Impairment    Three Minute Recall (captain, garden, picture) 3/3    Bernardo clock face with all 12 numbers and set the hands to show 5 past 8.  Yes    Cognitive concerns identified deferred for follow up unless specifically addressed in assessment and plan.    Fall Risk Assessment    Has the patient had two or more falls in the last year or any fall with injury in the last year?  No    Safety Assessment    Throw rugs on floor.  Yes  Handrails on all stairs.  Yes  Good lighting in all hallways.  Yes  Difficulty hearing.  No  Patient counseled about all safety risks that were identified.    Functional Assessment ADLs    Are there any barriers preventing you from cooking for yourself or meeting nutritional needs?  No.    Are there any barriers preventing you from driving safely or obtaining transportation?  No.    Are there any barriers preventing you from using a telephone or calling for help?  No.    Are there any barriers preventing you from shopping?  No.    Are there any barriers preventing you from taking care of your own finances?  No.    Are there any barriers preventing you from managing your medications?  No.    Are there any barriers preventing you from  showering, bathing or dressing yourself?  No.    Are you currently engaging in any exercise or physical activity?  No.     What is your perception of your health?  Excellent.      Health Maintenance Summary                COVID-19 Vaccine Overdue 3/28/1966     IMM ZOSTER VACCINES Overdue 3/28/2002     IMM PNEUMOCOCCAL VACCINE: 65+ Years Overdue 12/9/2017      Done 12/9/2016 Imm Admin: Pneumococcal Conjugate Vaccine (Prevnar/PCV-13)    COLON CANCER SCREENING ANNUAL FIT Overdue 8/27/2019      Done 8/27/2018 OCCULT BLOOD FECES IMMUNOASSAY    Annual Pulmonary Function Test / Spirometry Overdue 9/10/2019      Done 9/10/2018 AMB PULMONARY FUNCTION TEST/LAB    MAMMOGRAM Overdue 8/12/2020      Done 8/12/2019 MA-SCREENING MAMMO BILAT W/TOMOSYNTHESIS W/CAD     Patient has more history with this topic...    IMM INFLUENZA Next Due 9/1/2021      Done 10/11/2019 Ext Imm: Influenza, High Dose     Patient has more history with this topic...    Annual Wellness Visit Next Due 4/1/2022      Done 3/31/2021      Patient has more history with this topic...    BONE DENSITY Next Due 7/18/2023      Done 7/18/2018 DS-BONE DENSITY STUDY (DEXA)    IMM DTaP/Tdap/Td Vaccine Next Due 6/27/2026      Done 6/27/2016 Imm Admin: Tdap Vaccine          Patient Care Team:  Luiz Perry M.D. as PCP - General (Family Medicine)

## 2021-04-28 ENCOUNTER — HOSPITAL ENCOUNTER (OUTPATIENT)
Dept: RADIOLOGY | Facility: MEDICAL CENTER | Age: 71
End: 2021-04-28
Attending: FAMILY MEDICINE
Payer: MEDICARE

## 2021-04-28 DIAGNOSIS — Z12.31 ENCOUNTER FOR SCREENING MAMMOGRAM FOR BREAST CANCER: ICD-10-CM

## 2021-04-28 PROCEDURE — 77063 BREAST TOMOSYNTHESIS BI: CPT

## 2021-05-17 ENCOUNTER — TELEPHONE (OUTPATIENT)
Dept: MEDICAL GROUP | Facility: PHYSICIAN GROUP | Age: 71
End: 2021-05-17

## 2021-05-17 NOTE — TELEPHONE ENCOUNTER
Patient called office in regards to her fit test. Patient states that she has not received anything in the mail. We mailed it over twice and she stated that she has not received anything. I am going to mail it over a third time to her address. Was also advised to go to any [facility] at any time to pick one up.

## 2021-05-22 ENCOUNTER — HOSPITAL ENCOUNTER (OUTPATIENT)
Facility: MEDICAL CENTER | Age: 71
End: 2021-05-22
Attending: FAMILY MEDICINE
Payer: MEDICARE

## 2021-05-22 PROCEDURE — 82274 ASSAY TEST FOR BLOOD FECAL: CPT

## 2021-05-27 DIAGNOSIS — Z12.12 SCREENING FOR COLORECTAL CANCER: ICD-10-CM

## 2021-05-27 DIAGNOSIS — Z12.11 SCREENING FOR COLORECTAL CANCER: ICD-10-CM

## 2021-05-29 LAB — IMM ASSAY OCC BLD FITOB: NEGATIVE

## 2021-06-17 RX ORDER — FLUTICASONE PROPIONATE 50 MCG
1 SPRAY, SUSPENSION (ML) NASAL DAILY
Qty: 16 G | Refills: 3 | Status: SHIPPED | OUTPATIENT
Start: 2021-06-17 | End: 2022-01-21 | Stop reason: SDUPTHER

## 2021-06-17 NOTE — TELEPHONE ENCOUNTER
Received request via: Patient    Was the patient seen in the last year in this department? Yes    Does the patient have an active prescription (recently filled or refills available) for medication(s) requested? No     Requested Prescriptions     Pending Prescriptions Disp Refills   • Fluticasone Furoate-Vilanterol (BREO ELLIPTA) 100-25 MCG/INH AEROSOL POWDER, BREATH ACTIVATED 60 Each 0     Sig: Inhale 1 Puff every day.   • fluticasone (FLONASE) 50 MCG/ACT nasal spray 16 g 3     Sig: Administer 1 Spray into affected nostril(S) every day.

## 2021-08-03 ENCOUNTER — TELEPHONE (OUTPATIENT)
Dept: MEDICAL GROUP | Facility: PHYSICIAN GROUP | Age: 71
End: 2021-08-03

## 2021-08-03 NOTE — TELEPHONE ENCOUNTER
Phone Number Called: [phone] (home)       Call outcome: Spoke to patient regarding message below.    Message: Called to inform patient that Dr. Perry signed the dental paperwork and it was faxed to Dr. Hayes

## 2021-08-05 DIAGNOSIS — J45.41 MODERATE PERSISTENT ASTHMA WITH ACUTE EXACERBATION: ICD-10-CM

## 2021-08-09 DIAGNOSIS — J45.41 MODERATE PERSISTENT ASTHMA WITH ACUTE EXACERBATION: ICD-10-CM

## 2021-08-09 RX ORDER — TIOTROPIUM BROMIDE INHALATION SPRAY 3.12 UG/1
5 SPRAY, METERED RESPIRATORY (INHALATION) DAILY
Qty: 4 G | Refills: 2 | Status: SHIPPED | OUTPATIENT
Start: 2021-08-09 | End: 2022-11-20 | Stop reason: SDUPTHER

## 2021-11-04 NOTE — ADDENDUM NOTE
Addended by: MICHAEL SHORT on: 8/8/2019 11:57 AM     Modules accepted: Orders     Quinolones Pregnancy And Lactation Text: This medication is Pregnancy Category C and it isn't know if it is safe during pregnancy. It is also excreted in breast milk.

## 2021-11-17 DIAGNOSIS — R06.2 WHEEZING: ICD-10-CM

## 2021-11-17 RX ORDER — ALBUTEROL SULFATE 90 UG/1
AEROSOL, METERED RESPIRATORY (INHALATION)
Qty: 72 G | Refills: 3 | Status: SHIPPED | OUTPATIENT
Start: 2021-11-17 | End: 2022-01-21 | Stop reason: SDUPTHER

## 2022-01-03 ENCOUNTER — PATIENT MESSAGE (OUTPATIENT)
Dept: MEDICAL GROUP | Facility: PHYSICIAN GROUP | Age: 72
End: 2022-01-03

## 2022-01-03 RX ORDER — METHYLPREDNISOLONE 4 MG/1
TABLET ORAL
Qty: 21 TABLET | Refills: 0 | Status: SHIPPED | OUTPATIENT
Start: 2022-01-03 | End: 2022-01-03 | Stop reason: SDUPTHER

## 2022-01-03 NOTE — PATIENT COMMUNICATION
Pt needs this medication sent to [facility] in [place]. New order with new pharmacy pended.

## 2022-01-04 RX ORDER — METHYLPREDNISOLONE 4 MG/1
TABLET ORAL
Qty: 21 TABLET | Refills: 0 | Status: SHIPPED | OUTPATIENT
Start: 2022-01-04 | End: 2022-11-08 | Stop reason: SDUPTHER

## 2022-01-21 DIAGNOSIS — R06.2 WHEEZING: ICD-10-CM

## 2022-01-21 DIAGNOSIS — J45.41 MODERATE PERSISTENT ASTHMA WITH ACUTE EXACERBATION: ICD-10-CM

## 2022-01-21 RX ORDER — FLUTICASONE PROPIONATE 50 MCG
1 SPRAY, SUSPENSION (ML) NASAL DAILY
Qty: 16 G | Refills: 3 | Status: SHIPPED | OUTPATIENT
Start: 2022-01-21 | End: 2022-02-14

## 2022-01-21 RX ORDER — ALBUTEROL SULFATE 90 UG/1
AEROSOL, METERED RESPIRATORY (INHALATION)
Qty: 72 G | Refills: 3 | Status: SHIPPED | OUTPATIENT
Start: 2022-01-21 | End: 2022-11-20 | Stop reason: SDUPTHER

## 2022-01-21 NOTE — TELEPHONE ENCOUNTER
Received request via: Patient    Was the patient seen in the last year in this department? Yes    Does the patient have an active prescription (recently filled or refills available) for medication(s) requested? No     Pt changed pharmacy and needs prescription sent in.

## 2022-02-13 DIAGNOSIS — J45.41 MODERATE PERSISTENT ASTHMA WITH ACUTE EXACERBATION: ICD-10-CM

## 2022-02-14 RX ORDER — FLUTICASONE PROPIONATE 50 MCG
1 SPRAY, SUSPENSION (ML) NASAL DAILY
Qty: 16 ML | Refills: 3 | Status: SHIPPED | OUTPATIENT
Start: 2022-02-14 | End: 2023-05-03 | Stop reason: SDUPTHER

## 2022-02-21 ENCOUNTER — PATIENT MESSAGE (OUTPATIENT)
Dept: MEDICAL GROUP | Facility: PHYSICIAN GROUP | Age: 72
End: 2022-02-21
Payer: MEDICARE

## 2022-02-21 DIAGNOSIS — I10 BENIGN ESSENTIAL HTN: ICD-10-CM

## 2022-02-22 RX ORDER — HYDROCHLOROTHIAZIDE 25 MG/1
25 TABLET ORAL
Qty: 90 TABLET | Refills: 3 | Status: SHIPPED | OUTPATIENT
Start: 2022-02-22 | End: 2022-10-06 | Stop reason: SDUPTHER

## 2022-02-22 NOTE — TELEPHONE ENCOUNTER
From: Michell Moon  To: Physician Luiz Perry  Sent: 2/21/2022 9:27 PM PST  Subject: Hydrochlorthorazide refill    Dear Dr. Perry,   [facility] is going to request my 25mg hydrochloride refill. Please accept the request as I am out.    Thank you     Carolin

## 2022-05-10 DIAGNOSIS — J45.41 MODERATE PERSISTENT ASTHMA WITH ACUTE EXACERBATION: ICD-10-CM

## 2022-05-10 NOTE — TELEPHONE ENCOUNTER
Received request via: Pharmacy    Was the patient seen in the last year in this department? No    Does the patient have an active prescription (recently filled or refills available) for medication(s) requested? No    *message sent to patient to schedule a follow up appointment for long term refills*

## 2022-05-17 ENCOUNTER — TELEMEDICINE (OUTPATIENT)
Dept: MEDICAL GROUP | Facility: PHYSICIAN GROUP | Age: 72
End: 2022-05-17
Payer: MEDICARE

## 2022-05-17 VITALS — HEIGHT: 64 IN | WEIGHT: 140 LBS | BODY MASS INDEX: 23.9 KG/M2

## 2022-05-17 DIAGNOSIS — Z12.11 SCREENING FOR COLORECTAL CANCER: ICD-10-CM

## 2022-05-17 DIAGNOSIS — J45.20 MILD INTERMITTENT ASTHMA WITHOUT COMPLICATION: ICD-10-CM

## 2022-05-17 DIAGNOSIS — I10 BENIGN ESSENTIAL HTN: ICD-10-CM

## 2022-05-17 DIAGNOSIS — Z00.00 MEDICARE ANNUAL WELLNESS VISIT, SUBSEQUENT: ICD-10-CM

## 2022-05-17 DIAGNOSIS — Z12.31 ENCOUNTER FOR SCREENING MAMMOGRAM FOR BREAST CANCER: ICD-10-CM

## 2022-05-17 DIAGNOSIS — Z12.12 SCREENING FOR COLORECTAL CANCER: ICD-10-CM

## 2022-05-17 DIAGNOSIS — J45.41 MODERATE PERSISTENT ASTHMA WITH ACUTE EXACERBATION: ICD-10-CM

## 2022-05-17 PROCEDURE — G0439 PPPS, SUBSEQ VISIT: HCPCS | Mod: 95 | Performed by: FAMILY MEDICINE

## 2022-05-17 ASSESSMENT — PATIENT HEALTH QUESTIONNAIRE - PHQ9: CLINICAL INTERPRETATION OF PHQ2 SCORE: 0

## 2022-05-17 ASSESSMENT — ACTIVITIES OF DAILY LIVING (ADL): BATHING_REQUIRES_ASSISTANCE: 0

## 2022-05-17 ASSESSMENT — ENCOUNTER SYMPTOMS: GENERAL WELL-BEING: GOOD

## 2022-05-17 NOTE — PROGRESS NOTES
Chief Complaint   Patient presents with   • Annual Exam     AWV Medicare   Vitals obtained by patient:  Virtual Visit: Established Patient   This visit was conducted via Zoom using secure and encrypted videoconferencing technology.   The patient was in their home in the state [facility].    The patient's identity was confirmed and verbal consent was obtained for this virtual visit.     Subjective:   CC:   Chief Complaint   Patient presents with   • Annual Exam     AWV Medicare       Michell Moon is a 72 y.o. female presenting for evaluation and management of:        ROS       Current medicines (including changes today)  Current Outpatient Medications   Medication Sig Dispense Refill   • BREO ELLIPTA 100-25 MCG/INH AEROSOL POWDER, BREATH ACTIVATED INHALE 1 PUFF BY MOUTH EVERY DAY 60 Each 0   • hydroCHLOROthiazide (HYDRODIURIL) 25 MG Tab Take 1 Tablet by mouth every day. 90 Tablet 3   • fluticasone (FLONASE) 50 MCG/ACT nasal spray ADMINISTER 1 SPRAY INTO AFFECTED NOSTRIL(S) EVERY DAY. 16 mL 3   • albuterol (VENTOLIN HFA) 108 (90 Base) MCG/ACT Aero Soln inhalation aerosol USE 2 INHALATIONS BY MOUTH  EVERY 6 HOURS AS NEEDED FOR SHORTNESS OF BREATH 72 g 3   • methylPREDNISolone (MEDROL DOSEPAK) 4 MG Tablet Therapy Pack As directed on the packaging label. 21 Tablet 0   • tiotropium (SPIRIVA RESPIMAT) 2.5 mcg/Act Aero Soln Inhale 2 Inhalation every day. Assemble and prime. 4 g 2   • hydrocortisone rectal (PERIANAL) 2.5% Cream Insert 1 Application in rectum 2 times a day. 28 g 0   • traZODone (DESYREL) 50 MG Tab      • albuterol (PROVENTIL) 2.5mg/3ml Nebu Soln solution for nebulization 3 mL by Nebulization route every 6 hours as needed for Shortness of Breath. (Patient not taking: No sig reported) 30 Bullet 2     No current facility-administered medications for this visit.       Patient Active Problem List    Diagnosis Date Noted   • Localized osteoarthritis of left knee 07/31/2019   • Obesity (BMI 30-39.9) 06/18/2018   •  "Benign essential HTN 03/19/2018   • Mild intermittent asthma without complication 03/19/2018   • Hyperlipidemia 12/12/2017   • Screening for HIV (human immunodeficiency virus) 06/27/2016   • BMI 38.0-38.9,adult 06/27/2016   • Uncomplicated asthma 10/06/2015   • Essential hypertension 10/06/2015        Objective:   Ht 1.626 m (5' 4\")   Wt 63.5 kg (140 lb)   BMI 24.03 kg/m²     Physical Exam:Constitutional: Alert, no distress, well-groomed.  Skin: No rashes in visible areas.  Eye: Round. Conjunctiva clear, lids normal. No icterus.   ENMT: Lips pink without lesions, good dentition, moist mucous membranes. Phonation normal.  Neck: No masses, no thyromegaly. Moves freely without pain.  Respiratory: Unlabored respiratory effort, no cough or audible wheeze  Psych: Alert and oriented x3, normal affect and mood.     Assessment and Plan:   The following treatment plan was discussed:     1. Medicare annual wellness visit, subsequent  - Lipid Profile; Future  - Comp Metabolic Panel; Future    2. Moderate persistent asthma with acute exacerbation  - Lipid Profile; Future  - Comp Metabolic Panel; Future    3. Benign essential HTN  - Lipid Profile; Future  - Comp Metabolic Panel; Future    4. Encounter for screening mammogram for breast cancer  - MA-SCREENING MAMMO BILAT W/CAD; Future  - Lipid Profile; Future  - Comp Metabolic Panel; Future    5. Screening for colorectal cancer  - Lipid Profile; Future  - Comp Metabolic Panel; Future  - Referral to Gastroenterology    6. Mild intermittent asthma without complication      Follow-up: No follow-ups on file.         Physical Exam:  Constitutional: Alert, no distress, well-groomed.  Skin: No rashes in visible areas.  Eye: Round. Conjunctiva clear, lids normal. No icterus.   ENMT: Lips pink without lesions, good dentition, moist mucous membranes. Phonation normal.  Neck: No masses, no thyromegaly. Moves freely without pain.  Respiratory: Unlabored respiratory effort, no cough or " audible wheeze  Psych: Alert and oriented x3, normal affect and mood.       HPI:  Mihcell is a 72 y.o. here for Medicare Annual Wellness Visit        Patient Active Problem List    Diagnosis Date Noted   • Localized osteoarthritis of left knee 07/31/2019   • Obesity (BMI 30-39.9) 06/18/2018   • Benign essential HTN 03/19/2018   • Mild intermittent asthma without complication 03/19/2018   • Hyperlipidemia 12/12/2017   • Screening for HIV (human immunodeficiency virus) 06/27/2016   • BMI 38.0-38.9,adult 06/27/2016   • Uncomplicated asthma 10/06/2015   • Essential hypertension 10/06/2015       Current Outpatient Medications   Medication Sig Dispense Refill   • BREO ELLIPTA 100-25 MCG/INH AEROSOL POWDER, BREATH ACTIVATED INHALE 1 PUFF BY MOUTH EVERY DAY 60 Each 0   • hydroCHLOROthiazide (HYDRODIURIL) 25 MG Tab Take 1 Tablet by mouth every day. 90 Tablet 3   • fluticasone (FLONASE) 50 MCG/ACT nasal spray ADMINISTER 1 SPRAY INTO AFFECTED NOSTRIL(S) EVERY DAY. 16 mL 3   • albuterol (VENTOLIN HFA) 108 (90 Base) MCG/ACT Aero Soln inhalation aerosol USE 2 INHALATIONS BY MOUTH  EVERY 6 HOURS AS NEEDED FOR SHORTNESS OF BREATH 72 g 3   • methylPREDNISolone (MEDROL DOSEPAK) 4 MG Tablet Therapy Pack As directed on the packaging label. 21 Tablet 0   • tiotropium (SPIRIVA RESPIMAT) 2.5 mcg/Act Aero Soln Inhale 2 Inhalation every day. Assemble and prime. 4 g 2   • hydrocortisone rectal (PERIANAL) 2.5% Cream Insert 1 Application in rectum 2 times a day. 28 g 0   • traZODone (DESYREL) 50 MG Tab      • albuterol (PROVENTIL) 2.5mg/3ml Nebu Soln solution for nebulization 3 mL by Nebulization route every 6 hours as needed for Shortness of Breath. (Patient not taking: No sig reported) 30 Bullet 2     No current facility-administered medications for this visit.        Patient is taking medications as noted in medication list.  Current supplements as per medication list.     Allergies: Pollen extract and Atorvastatin    Current social  contact/activities: music     Is patient current with immunizations? Yes.    She  reports that she quit smoking about 11 years ago. Her smoking use included cigarettes. She has a 1.00 pack-year smoking history. She has never used smokeless tobacco. She reports current alcohol use. She reports that she does not use drugs.  Counseling given: Not Answered  Comment: continued abstinance        DPA/Advanced directive: Patient does not have an Advanced Directive.  A packet and workshop information was given on Advanced Directives.    ROS:    Gait: Uses no assistive device   Ostomy: No   Other tubes: No   Amputations: No   Chronic oxygen use No   Last eye exam 2020   Wears hearing aids: No   : Denies any urinary leakage during the last 6 months      Screening:        Depression Screening  Little interest or pleasure in doing things?  0 - not at all  Feeling down, depressed, or hopeless? 0 - not at all  Patient Health Questionnaire Score: 0    If depressive symptoms identified deferred to follow up visit unless specifically addressed in assessment and plan.    Interpretation of PHQ-9 Total Score   Score Severity   1-4 No Depression   5-9 Mild Depression   10-14 Moderate Depression   15-19 Moderately Severe Depression   20-27 Severe Depression    Screening for Cognitive Impairment  Three Minute Recall (daughter, heaven, mountain)  3/3    Bernardo clock face with all 12 numbers and set the hands to show 10 past 11.  Yes    If cognitive concerns identified, deferred for follow up unless specifically addressed in assessment and plan.    Fall Risk Assessment  Has the patient had two or more falls in the last year or any fall with injury in the last year?  No  If fall risk identified, deferred for follow up unless specifically addressed in assessment and plan.    Safety Assessment  Throw rugs on floor.  Yes  Handrails on all stairs.  Yes  Good lighting in all hallways.  Yes  Difficulty hearing.  No  Patient counseled about all  safety risks that were identified.    Functional Assessment ADLs  Are there any barriers preventing you from cooking for yourself or meeting nutritional needs?  No.    Are there any barriers preventing you from driving safely or obtaining transportation?  No.    Are there any barriers preventing you from using a telephone or calling for help?  No.    Are there any barriers preventing you from shopping?  No.    Are there any barriers preventing you from taking care of your own finances?  No.    Are there any barriers preventing you from managing your medications?  No.    Are there any barriers preventing you from showering, bathing or dressing yourself?  No.    Are you currently engaging in any exercise or physical activity?  Yes.     What is your perception of your health?  Good.    Health Maintenance Summary          Overdue - COVID-19 Vaccine (1) Overdue - never done    No completion history exists for this topic.          Overdue - IMM ZOSTER VACCINES (1 of 2) Overdue - never done    No completion history exists for this topic.          Overdue - IMM PNEUMOCOCCAL VACCINE: 65+ Years (2 - PPSV23 or PCV20) Overdue since 12/9/2017 12/09/2016  Imm Admin: Pneumococcal Conjugate Vaccine (Prevnar/PCV-13)          Overdue - Annual Wellness Visit (Every 366 Days) Overdue since 4/1/2022 03/31/2021  Done    03/31/2021  Visit Dx: Medicare annual wellness visit, subsequent          Overdue - MAMMOGRAM (Yearly) Overdue since 4/28/2022 04/28/2021  MA-SCREENING MAMMO BILAT W/TOMOSYNTHESIS W/CAD    08/12/2019  MA-SCREENING MAMMO BILAT W/TOMOSYNTHESIS W/CAD    07/18/2018  MA-MAMMO SCREENING BILAT W/AWAIS W/CAD          COLORECTAL CANCER SCREENING (COLON CANCER SCREENING ANNUAL FIT - Yearly) Due soon on 5/22/2022 05/22/2021  OCCULT BLOOD FECES IMMUNOASSAY (FIT)    08/27/2018  OCCULT BLOOD FECES IMMUNOASSAY (FIT)          IMM INFLUENZA (Season Ended) Next due on 9/1/2022    10/11/2019  Imm Admin: Influenza Vaccine Adult  "HD    2018  Imm Admin: Influenza Vaccine Adult HD    10/01/2017  Imm Admin: INFLUENZA TIV (IM)    10/01/2017  Imm Admin: Influenza (IM) Preservative Free - HISTORICAL DATA    2016  Imm Admin: Influenza Seasonal Injectable - Historical Data    Only the first 5 history entries have been loaded, but more history exists.          BONE DENSITY (Every 5 Years) Next due on 2018  DS-BONE DENSITY STUDY (DEXA)          IMM DTaP/Tdap/Td Vaccine (2 - Td or Tdap) Next due on 2016  Imm Admin: Tdap Vaccine          IMM HEP B VACCINE (Series Information) Aged Out    No completion history exists for this topic.          IMM MENINGOCOCCAL VACCINE (MCV4) (Series Information) Aged Out    No completion history exists for this topic.          Discontinued - PAP SMEAR  Discontinued    No completion history exists for this topic.          Discontinued - HEPATITIS C SCREENING  Discontinued    No completion history exists for this topic.                Patient Care Team:  Luiz Perry M.D. as PCP - General (Family Medicine)    Social History     Tobacco Use   • Smoking status: Former Smoker     Packs/day: 0.25     Years: 4.00     Pack years: 1.00     Types: Cigarettes     Quit date: 3/1/2011     Years since quittin.2   • Smokeless tobacco: Never Used   • Tobacco comment: continued abstinance   Vaping Use   • Vaping Use: Never used   Substance Use Topics   • Alcohol use: Yes     Comment: 3 drinks a week   • Drug use: No     Family History   Problem Relation Age of Onset   • Prostate cancer Father      She  has a past medical history of Asthma, Bronchitis, Chickenpox, COPD (chronic obstructive pulmonary disease) (HCC), Hypertension, Mumps, Osteoporosis, and Pulmonary emphysema (HCC).   Past Surgical History:   Procedure Laterality Date   • ARTHROPLASTY      hip r   • HIP REPLACEMENT, TOTAL     • HYSTERECTOMY LAPAROSCOPY             Exam:     Ht 1.626 m (5' 4\")   Wt 63.5 kg (140 lb)  " Body mass index is 24.03 kg/m².    Hearing good.    Dentition good  Alert, oriented in no acute distress.  Eye contact is good, speech goal directed, affect calm      Assessment and Plan. The following treatment and monitoring plan is recommended:    1. Medicare annual wellness visit, subsequent    - Lipid Profile; Future  - Comp Metabolic Panel; Future    2. Moderate persistent asthma with acute exacerbation  The patient's current medical issue is well controlled on the current therapy with no new symptoms or worsening    - Lipid Profile; Future  - Comp Metabolic Panel; Future    3. Benign essential HTN  Currently treated for HTN, taking meds with no CP or sob, monitors bp at home periodically. controlled      - Lipid Profile; Future  - Comp Metabolic Panel; Future    4. Encounter for screening mammogram for breast cancer    - MA-SCREENING MAMMO BILAT W/CAD; Future  - Lipid Profile; Future  - Comp Metabolic Panel; Future    5. Screening for colorectal cancer    - Lipid Profile; Future  - Comp Metabolic Panel; Future  - Referral to Gastroenterology    6. Mild intermittent asthma without complication  The patient's current medical issue is well controlled on the current therapy with no new symptoms or worsening      Past Medical History:   Diagnosis Date   • Asthma    • Bronchitis    • Chickenpox    • COPD (chronic obstructive pulmonary disease) (HCC)    • Hypertension    • Mumps    • Osteoporosis    • Pulmonary emphysema (HCC)      Past Surgical History:   Procedure Laterality Date   • ARTHROPLASTY      hip r   • HIP REPLACEMENT, TOTAL     • HYSTERECTOMY LAPAROSCOPY       Social History     Tobacco Use   • Smoking status: Former Smoker     Packs/day: 0.25     Years: 4.00     Pack years: 1.00     Types: Cigarettes     Quit date: 3/1/2011     Years since quittin.2   • Smokeless tobacco: Never Used   • Tobacco comment: continued abstinance   Vaping Use   • Vaping Use: Never used   Substance Use Topics   • Alcohol  use: Yes     Comment: 3 drinks a week   • Drug use: No     Family History   Problem Relation Age of Onset   • Prostate cancer Father          Current Outpatient Medications:   •  BREO ELLIPTA 100-25 MCG/INH AEROSOL POWDER, BREATH ACTIVATED, INHALE 1 PUFF BY MOUTH EVERY DAY, Disp: 60 Each, Rfl: 0  •  hydroCHLOROthiazide (HYDRODIURIL) 25 MG Tab, Take 1 Tablet by mouth every day., Disp: 90 Tablet, Rfl: 3  •  fluticasone (FLONASE) 50 MCG/ACT nasal spray, ADMINISTER 1 SPRAY INTO AFFECTED NOSTRIL(S) EVERY DAY., Disp: 16 mL, Rfl: 3  •  albuterol (VENTOLIN HFA) 108 (90 Base) MCG/ACT Aero Soln inhalation aerosol, USE 2 INHALATIONS BY MOUTH  EVERY 6 HOURS AS NEEDED FOR SHORTNESS OF BREATH, Disp: 72 g, Rfl: 3  •  methylPREDNISolone (MEDROL DOSEPAK) 4 MG Tablet Therapy Pack, As directed on the packaging label., Disp: 21 Tablet, Rfl: 0  •  tiotropium (SPIRIVA RESPIMAT) 2.5 mcg/Act Aero Soln, Inhale 2 Inhalation every day. Assemble and prime., Disp: 4 g, Rfl: 2  •  hydrocortisone rectal (PERIANAL) 2.5% Cream, Insert 1 Application in rectum 2 times a day., Disp: 28 g, Rfl: 0  •  traZODone (DESYREL) 50 MG Tab, , Disp: , Rfl:   •  albuterol (PROVENTIL) 2.5mg/3ml Nebu Soln solution for nebulization, 3 mL by Nebulization route every 6 hours as needed for Shortness of Breath. (Patient not taking: No sig reported), Disp: 30 Bullet, Rfl: 2    Patient was instructed on the use of medications, either prescriptions or OTC and informed on when the appropriate follow up time period should be. In addition, patient was also instructed that should any acute worsening occur that they should notify this clinic asap or call 911.          Services suggested: No services needed at this time  Health Care Screening recommendations as per orders if indicated.  Referrals offered: PT/OT/Nutrition counseling/Behavioral Health/Smoking cessation as per orders if indicated.    Discussion today about general wellness and lifestyle habits:    · Prevent falls  and reduce trip hazards; Cautioned about securing or removing rugs.  · Have a working fire alarm and carbon monoxide detector;   · Engage in regular physical activity and social activities       Follow-up: No follow-ups on file.

## 2022-06-09 DIAGNOSIS — J45.41 MODERATE PERSISTENT ASTHMA WITH ACUTE EXACERBATION: ICD-10-CM

## 2022-07-18 ENCOUNTER — PATIENT MESSAGE (OUTPATIENT)
Dept: MEDICAL GROUP | Facility: PHYSICIAN GROUP | Age: 72
End: 2022-07-18
Payer: MEDICARE

## 2022-07-18 DIAGNOSIS — J45.41 MODERATE PERSISTENT ASTHMA WITH ACUTE EXACERBATION: ICD-10-CM

## 2022-08-09 DIAGNOSIS — J45.41 MODERATE PERSISTENT ASTHMA WITH ACUTE EXACERBATION: ICD-10-CM

## 2022-08-14 DIAGNOSIS — Z12.12 SCREENING FOR COLORECTAL CANCER: ICD-10-CM

## 2022-08-14 DIAGNOSIS — Z12.11 SCREENING FOR COLORECTAL CANCER: ICD-10-CM

## 2022-08-14 DIAGNOSIS — J45.41 MODERATE PERSISTENT ASTHMA WITH ACUTE EXACERBATION: ICD-10-CM

## 2022-08-15 RX ORDER — BUDESONIDE AND FORMOTEROL FUMARATE DIHYDRATE 160; 4.5 UG/1; UG/1
2 AEROSOL RESPIRATORY (INHALATION) EVERY 6 HOURS PRN
Qty: 6 G | Refills: 0 | Status: SHIPPED | OUTPATIENT
Start: 2022-08-15 | End: 2022-09-15

## 2022-09-15 DIAGNOSIS — J45.41 MODERATE PERSISTENT ASTHMA WITH ACUTE EXACERBATION: ICD-10-CM

## 2022-09-15 RX ORDER — BUDESONIDE AND FORMOTEROL FUMARATE DIHYDRATE 160; 4.5 UG/1; UG/1
AEROSOL RESPIRATORY (INHALATION)
Qty: 10 G | Refills: 1 | Status: SHIPPED | OUTPATIENT
Start: 2022-09-15 | End: 2023-05-03 | Stop reason: SDUPTHER

## 2022-09-21 ENCOUNTER — HOSPITAL ENCOUNTER (OUTPATIENT)
Facility: MEDICAL CENTER | Age: 72
End: 2022-09-21
Attending: FAMILY MEDICINE
Payer: MEDICARE

## 2022-09-21 LAB — AMBIGUOUS SPECIMEN AMBIS: NORMAL

## 2022-09-21 PROCEDURE — 82274 ASSAY TEST FOR BLOOD FECAL: CPT

## 2022-09-24 LAB — IMM ASSAY OCC BLD FITOB: NEGATIVE

## 2022-10-10 NOTE — TELEPHONE ENCOUNTER
Received request via: Patient    Was the patient seen in the last year in this department? Yes    Does the patient have an active prescription (recently filled or refills available) for medication(s) requested? No     Requested Prescriptions     Pending Prescriptions Disp Refills   • Fluticasone Furoate-Vilanterol (BREO ELLIPTA) 100-25 MCG/INH AEROSOL POWDER, BREATH ACTIVATED 60 Each 0     Sig: Inhale 1 Puff every day.         
on the discharge service for the patient. I have reviewed and made amendments to the documentation where necessary.

## 2022-11-09 RX ORDER — METHYLPREDNISOLONE 4 MG/1
TABLET ORAL
Qty: 21 TABLET | Refills: 0 | Status: SHIPPED | OUTPATIENT
Start: 2022-11-09 | End: 2023-08-13 | Stop reason: SDUPTHER

## 2022-11-10 ENCOUNTER — TELEPHONE (OUTPATIENT)
Dept: MEDICAL GROUP | Facility: PHYSICIAN GROUP | Age: 72
End: 2022-11-10
Payer: MEDICARE

## 2022-11-10 NOTE — TELEPHONE ENCOUNTER
Spoke to pt to clarify which medication she need refill and turns out she need a refill for prednisone. Let pt know that Dr Perry did not fill his medication before and in order to get a refill from him she will need to schedule an appointment. Pt understood and said she'll call back.

## 2022-11-20 DIAGNOSIS — I10 BENIGN ESSENTIAL HTN: ICD-10-CM

## 2022-11-20 DIAGNOSIS — J45.41 MODERATE PERSISTENT ASTHMA WITH ACUTE EXACERBATION: ICD-10-CM

## 2022-11-20 DIAGNOSIS — R06.2 WHEEZING: ICD-10-CM

## 2022-11-21 DIAGNOSIS — J45.41 MODERATE PERSISTENT ASTHMA WITH ACUTE EXACERBATION: ICD-10-CM

## 2022-11-21 RX ORDER — ALBUTEROL SULFATE 90 UG/1
AEROSOL, METERED RESPIRATORY (INHALATION)
Qty: 72 G | Refills: 3 | Status: SHIPPED | OUTPATIENT
Start: 2022-11-21 | End: 2023-08-15 | Stop reason: SDUPTHER

## 2022-11-21 RX ORDER — TIOTROPIUM BROMIDE INHALATION SPRAY 3.12 UG/1
SPRAY, METERED RESPIRATORY (INHALATION)
Qty: 4 G | Refills: 2 | Status: SHIPPED | OUTPATIENT
Start: 2022-11-21 | End: 2023-05-03 | Stop reason: SDUPTHER

## 2022-11-21 RX ORDER — TIOTROPIUM BROMIDE INHALATION SPRAY 3.12 UG/1
5 SPRAY, METERED RESPIRATORY (INHALATION) DAILY
Qty: 4 G | Refills: 2 | Status: SHIPPED | OUTPATIENT
Start: 2022-11-21 | End: 2022-11-21

## 2022-11-21 RX ORDER — HYDROCHLOROTHIAZIDE 25 MG/1
25 TABLET ORAL
Qty: 30 TABLET | Refills: 1 | Status: SHIPPED | OUTPATIENT
Start: 2022-11-21 | End: 2023-05-03 | Stop reason: SDUPTHER

## 2022-11-21 NOTE — TELEPHONE ENCOUNTER
Received request via: Patient    Was the patient seen in the last year in this department? Yes    Does the patient have an active prescription (recently filled or refills available) for medication(s) requested? No    Does the patient have care home Plus and need 100 day supply (blood pressure, diabetes and cholesterol meds only)? Patient does not have SCP

## 2022-11-21 NOTE — TELEPHONE ENCOUNTER
Received request via: Patient    Was the patient seen in the last year in this department? Yes    Does the patient have an active prescription (recently filled or refills available) for medication(s) requested? No    Does the patient have skilled nursing Plus and need 100 day supply (blood pressure, diabetes and cholesterol meds only)? Patient does not have SCP

## 2022-11-25 ENCOUNTER — PATIENT MESSAGE (OUTPATIENT)
Dept: MEDICAL GROUP | Facility: PHYSICIAN GROUP | Age: 72
End: 2022-11-25
Payer: MEDICARE

## 2022-11-25 DIAGNOSIS — R06.2 WHEEZING: ICD-10-CM

## 2022-11-28 RX ORDER — ALBUTEROL SULFATE 2.5 MG/3ML
2.5 SOLUTION RESPIRATORY (INHALATION) EVERY 6 HOURS PRN
Qty: 60 EACH | Refills: 2 | Status: SHIPPED | OUTPATIENT
Start: 2022-11-28

## 2022-11-29 NOTE — PATIENT COMMUNICATION
Received request via: Patient    Was the patient seen in the last year in this department? Yes    Does the patient have an active prescription (recently filled or refills available) for medication(s) requested? No    Does the patient have [facility] Plus and need 100 day supply (blood pressure, diabetes and cholesterol meds only)? Patient does not have SCP

## 2023-05-03 ENCOUNTER — PATIENT MESSAGE (OUTPATIENT)
Dept: MEDICAL GROUP | Facility: PHYSICIAN GROUP | Age: 73
End: 2023-05-03
Payer: MEDICARE

## 2023-05-03 DIAGNOSIS — I10 BENIGN ESSENTIAL HTN: ICD-10-CM

## 2023-05-03 DIAGNOSIS — J45.41 MODERATE PERSISTENT ASTHMA WITH ACUTE EXACERBATION: ICD-10-CM

## 2023-05-03 RX ORDER — BUDESONIDE AND FORMOTEROL FUMARATE DIHYDRATE 160; 4.5 UG/1; UG/1
AEROSOL RESPIRATORY (INHALATION)
Qty: 10 G | Refills: 1 | Status: SHIPPED | OUTPATIENT
Start: 2023-05-03 | End: 2023-05-03 | Stop reason: SDUPTHER

## 2023-05-03 RX ORDER — FLUTICASONE PROPIONATE 50 MCG
1 SPRAY, SUSPENSION (ML) NASAL DAILY
Qty: 16 ML | Refills: 3 | Status: SHIPPED | OUTPATIENT
Start: 2023-05-03 | End: 2023-05-03 | Stop reason: SDUPTHER

## 2023-05-03 RX ORDER — HYDROCHLOROTHIAZIDE 25 MG/1
25 TABLET ORAL
Qty: 30 TABLET | Refills: 1 | Status: SHIPPED | OUTPATIENT
Start: 2023-05-03 | End: 2023-05-03 | Stop reason: SDUPTHER

## 2023-05-03 RX ORDER — TIOTROPIUM BROMIDE INHALATION SPRAY 3.12 UG/1
SPRAY, METERED RESPIRATORY (INHALATION)
Qty: 4 G | Refills: 2 | Status: SHIPPED | OUTPATIENT
Start: 2023-05-03 | End: 2023-05-03 | Stop reason: SDUPTHER

## 2023-05-03 NOTE — PATIENT COMMUNICATION
Received request via: Patient    Was the patient seen in the last year in this department? Yes    Does the patient have an active prescription (recently filled or refills available) for medication(s) requested?  Patient would like medications sent over to new pharmacy, confirmed new pharmacy with patient.    Does the patient have [facility] Plus and need 100 day supply (blood pressure, diabetes and cholesterol meds only)? Patient does not have SCP

## 2023-05-04 RX ORDER — TIOTROPIUM BROMIDE INHALATION SPRAY 3.12 UG/1
SPRAY, METERED RESPIRATORY (INHALATION)
Qty: 4 G | Refills: 2 | Status: SHIPPED | OUTPATIENT
Start: 2023-05-04 | End: 2023-08-15 | Stop reason: SDUPTHER

## 2023-05-04 RX ORDER — FLUTICASONE PROPIONATE 50 MCG
1 SPRAY, SUSPENSION (ML) NASAL DAILY
Qty: 16 ML | Refills: 3 | Status: SHIPPED | OUTPATIENT
Start: 2023-05-04 | End: 2023-05-17

## 2023-05-04 RX ORDER — HYDROCHLOROTHIAZIDE 25 MG/1
25 TABLET ORAL
Qty: 30 TABLET | Refills: 1 | Status: SHIPPED | OUTPATIENT
Start: 2023-05-04 | End: 2023-08-10

## 2023-05-04 RX ORDER — BUDESONIDE AND FORMOTEROL FUMARATE DIHYDRATE 160; 4.5 UG/1; UG/1
AEROSOL RESPIRATORY (INHALATION)
Qty: 10 G | Refills: 1 | Status: SHIPPED | OUTPATIENT
Start: 2023-05-04 | End: 2023-08-15 | Stop reason: SDUPTHER

## 2023-05-17 DIAGNOSIS — J45.41 MODERATE PERSISTENT ASTHMA WITH ACUTE EXACERBATION: ICD-10-CM

## 2023-05-17 RX ORDER — FLUTICASONE PROPIONATE 50 MCG
1 SPRAY, SUSPENSION (ML) NASAL DAILY
Qty: 48 ML | Refills: 2 | Status: SHIPPED | OUTPATIENT
Start: 2023-05-17 | End: 2023-07-22 | Stop reason: SDUPTHER

## 2023-07-22 DIAGNOSIS — J45.41 MODERATE PERSISTENT ASTHMA WITH ACUTE EXACERBATION: ICD-10-CM

## 2023-07-24 RX ORDER — FLUTICASONE PROPIONATE 50 MCG
1 SPRAY, SUSPENSION (ML) NASAL DAILY
Qty: 48 ML | Refills: 2 | Status: SHIPPED | OUTPATIENT
Start: 2023-07-24

## 2023-08-09 DIAGNOSIS — I10 BENIGN ESSENTIAL HTN: ICD-10-CM

## 2023-08-10 RX ORDER — HYDROCHLOROTHIAZIDE 25 MG/1
25 TABLET ORAL DAILY
Qty: 90 TABLET | Refills: 0 | Status: SHIPPED | OUTPATIENT
Start: 2023-08-10 | End: 2023-08-15 | Stop reason: SDUPTHER

## 2023-08-13 DIAGNOSIS — I10 BENIGN ESSENTIAL HTN: ICD-10-CM

## 2023-08-14 ENCOUNTER — APPOINTMENT (OUTPATIENT)
Dept: MEDICAL GROUP | Facility: PHYSICIAN GROUP | Age: 73
End: 2023-08-14
Payer: MEDICARE

## 2023-08-14 RX ORDER — METHYLPREDNISOLONE 4 MG/1
TABLET ORAL
Qty: 21 TABLET | Refills: 0 | Status: SHIPPED | OUTPATIENT
Start: 2023-08-14 | End: 2023-08-15 | Stop reason: SDUPTHER

## 2023-08-14 RX ORDER — HYDROCHLOROTHIAZIDE 25 MG/1
25 TABLET ORAL DAILY
Qty: 90 TABLET | Refills: 0 | OUTPATIENT
Start: 2023-08-14

## 2023-08-14 NOTE — TELEPHONE ENCOUNTER
Received request via: Patient    Was the patient seen in the last year in this department? No last OV 5/17/2022    Does the patient have an active prescription (recently filled or refills available) for medication(s) requested? No    Does the patient have retirement Plus and need 100 day supply (blood pressure, diabetes and cholesterol meds only)? Patient does not have SCP

## 2023-08-15 ENCOUNTER — TELEMEDICINE (OUTPATIENT)
Dept: MEDICAL GROUP | Facility: PHYSICIAN GROUP | Age: 73
End: 2023-08-15
Payer: MEDICARE

## 2023-08-15 VITALS
DIASTOLIC BLOOD PRESSURE: 88 MMHG | HEIGHT: 65 IN | SYSTOLIC BLOOD PRESSURE: 136 MMHG | BODY MASS INDEX: 25.83 KG/M2 | WEIGHT: 155 LBS

## 2023-08-15 DIAGNOSIS — R06.2 WHEEZING: ICD-10-CM

## 2023-08-15 DIAGNOSIS — Z12.12 SCREENING FOR COLORECTAL CANCER: ICD-10-CM

## 2023-08-15 DIAGNOSIS — I10 BENIGN ESSENTIAL HTN: ICD-10-CM

## 2023-08-15 DIAGNOSIS — J45.41 MODERATE PERSISTENT ASTHMA WITH ACUTE EXACERBATION: ICD-10-CM

## 2023-08-15 DIAGNOSIS — Z12.11 SCREENING FOR COLORECTAL CANCER: ICD-10-CM

## 2023-08-15 PROCEDURE — 99214 OFFICE O/P EST MOD 30 MIN: CPT | Mod: 95 | Performed by: FAMILY MEDICINE

## 2023-08-15 RX ORDER — TIOTROPIUM BROMIDE INHALATION SPRAY 3.12 UG/1
SPRAY, METERED RESPIRATORY (INHALATION)
Qty: 4 G | Refills: 2 | Status: SHIPPED | OUTPATIENT
Start: 2023-08-15

## 2023-08-15 RX ORDER — METHYLPREDNISOLONE 4 MG/1
TABLET ORAL
Qty: 21 TABLET | Refills: 0 | Status: SHIPPED | OUTPATIENT
Start: 2023-08-15

## 2023-08-15 RX ORDER — TRAZODONE HYDROCHLORIDE 50 MG/1
50 TABLET ORAL NIGHTLY
Qty: 100 TABLET | Refills: 3 | Status: SHIPPED | OUTPATIENT
Start: 2023-08-15

## 2023-08-15 RX ORDER — BUDESONIDE AND FORMOTEROL FUMARATE DIHYDRATE 160; 4.5 UG/1; UG/1
AEROSOL RESPIRATORY (INHALATION)
Qty: 10 G | Refills: 1 | Status: SHIPPED | OUTPATIENT
Start: 2023-08-15

## 2023-08-15 RX ORDER — ALBUTEROL SULFATE 90 UG/1
AEROSOL, METERED RESPIRATORY (INHALATION)
Qty: 72 G | Refills: 3 | Status: SHIPPED | OUTPATIENT
Start: 2023-08-15

## 2023-08-15 RX ORDER — HYDROCHLOROTHIAZIDE 50 MG/1
50 TABLET ORAL DAILY
Qty: 100 TABLET | Refills: 0 | Status: SHIPPED | OUTPATIENT
Start: 2023-08-15

## 2023-08-15 ASSESSMENT — PATIENT HEALTH QUESTIONNAIRE - PHQ9: CLINICAL INTERPRETATION OF PHQ2 SCORE: 0

## 2023-08-15 NOTE — PROGRESS NOTES
Virtual Visit: Established Patient   This visit was conducted via Zoom using secure and encrypted videoconferencing technology.   The patient was in their home in the state [facility].    The patient's identity was confirmed and verbal consent was obtained for this virtual visit.    Subjective:   CC:   Chief Complaint   Patient presents with    Medication Refill     Michell Moon is a 73 y.o. female presenting for evaluation and management of:        ROS   1. Moderate persistent asthma with acute exacerbation  The patient's current medical issue is well controlled on the current therapy with no new symptoms or worsening    - tiotropium (SPIRIVA RESPIMAT) 2.5 mcg/Act Aero Soln; INHALE 2 INHALATION BY MOUTH EVERY DAY. ASSEMBLE AND PRIME.  Dispense: 4 g; Refill: 2  - budesonide-formoterol (SYMBICORT) 160-4.5 MCG/ACT Aerosol; INHALE 2 PUFFS EVERY 6 HOURS AS NEEDED (SHORTNESS OF BREATH).  Dispense: 10 g; Refill: 1    2. Wheezing  Currently treated for HTN, taking meds with no CP or sob, monitors bp at home periodically. Controlled    - albuterol (VENTOLIN HFA) 108 (90 Base) MCG/ACT Aero Soln inhalation aerosol; USE 2 INHALATIONS BY MOUTH  EVERY 6 HOURS AS NEEDED FOR SHORTNESS OF BREATH  Dispense: 72 g; Refill: 3    3. Benign essential HTN  Currently treated for HTN, taking meds with no CP or sob, monitors bp at home periodically. Controlled    - hydroCHLOROthiazide (HYDRODIURIL) 50 MG Tab; Take 1 Tablet by mouth every day.  Dispense: 100 Tablet; Refill: 0    Past Medical History:   Diagnosis Date    Asthma     Bronchitis     Chickenpox     COPD (chronic obstructive pulmonary disease) (HCC)     Hypertension     Mumps     Osteoporosis     Pulmonary emphysema (HCC)      Past Surgical History:   Procedure Laterality Date    ARTHROPLASTY      hip r    HIP REPLACEMENT, TOTAL      HYSTERECTOMY LAPAROSCOPY         Social History     Tobacco Use    Smoking status: Former     Packs/day: 0.25     Years: 4.00     Pack years: 1.00      Types: Cigarettes     Quit date: 3/1/2011     Years since quittin.4    Smokeless tobacco: Never    Tobacco comments:     continued abstinance   Vaping Use    Vaping Use: Never used   Substance Use Topics    Alcohol use: Yes     Comment: 3 drinks a week    Drug use: No       Family History   Problem Relation Age of Onset    Prostate cancer Father          Current Outpatient Medications:     methylPREDNISolone (MEDROL DOSEPAK) 4 MG Tablet Therapy Pack, As directed on the packaging label., Disp: 21 Tablet, Rfl: 0    traZODone (DESYREL) 50 MG Tab, Take 1 Tablet by mouth every evening., Disp: 100 Tablet, Rfl: 3    tiotropium (SPIRIVA RESPIMAT) 2.5 mcg/Act Aero Soln, INHALE 2 INHALATION BY MOUTH EVERY DAY. ASSEMBLE AND PRIME., Disp: 4 g, Rfl: 2    budesonide-formoterol (SYMBICORT) 160-4.5 MCG/ACT Aerosol, INHALE 2 PUFFS EVERY 6 HOURS AS NEEDED (SHORTNESS OF BREATH)., Disp: 10 g, Rfl: 1    albuterol (VENTOLIN HFA) 108 (90 Base) MCG/ACT Aero Soln inhalation aerosol, USE 2 INHALATIONS BY MOUTH  EVERY 6 HOURS AS NEEDED FOR SHORTNESS OF BREATH, Disp: 72 g, Rfl: 3    hydroCHLOROthiazide (HYDRODIURIL) 50 MG Tab, Take 1 Tablet by mouth every day., Disp: 100 Tablet, Rfl: 0    fluticasone (FLONASE) 50 MCG/ACT nasal spray, Administer 1 Spray into affected nostril(S) every day., Disp: 48 mL, Rfl: 2    albuterol (PROVENTIL) 2.5mg/3ml Nebu Soln solution for nebulization, Take 3 mL by nebulization every 6 hours as needed for Shortness of Breath., Disp: 60 Each, Rfl: 2    hydrocortisone rectal (PERIANAL) 2.5% Cream, Insert 1 Application in rectum 2 times a day. (Patient not taking: Reported on 8/15/2023), Disp: 28 g, Rfl: 0    Patient was instructed on the use of medications, either prescriptions or OTC and informed on when the appropriate follow up time period should be. In addition, patient was also instructed that should any acute worsening occur that they should notify this clinic asap or call 911.        Current medicines  "(including changes today)  Current Outpatient Medications   Medication Sig Dispense Refill    methylPREDNISolone (MEDROL DOSEPAK) 4 MG Tablet Therapy Pack As directed on the packaging label. 21 Tablet 0    traZODone (DESYREL) 50 MG Tab Take 1 Tablet by mouth every evening. 100 Tablet 3    tiotropium (SPIRIVA RESPIMAT) 2.5 mcg/Act Aero Soln INHALE 2 INHALATION BY MOUTH EVERY DAY. ASSEMBLE AND PRIME. 4 g 2    budesonide-formoterol (SYMBICORT) 160-4.5 MCG/ACT Aerosol INHALE 2 PUFFS EVERY 6 HOURS AS NEEDED (SHORTNESS OF BREATH). 10 g 1    albuterol (VENTOLIN HFA) 108 (90 Base) MCG/ACT Aero Soln inhalation aerosol USE 2 INHALATIONS BY MOUTH  EVERY 6 HOURS AS NEEDED FOR SHORTNESS OF BREATH 72 g 3    hydroCHLOROthiazide (HYDRODIURIL) 50 MG Tab Take 1 Tablet by mouth every day. 100 Tablet 0    fluticasone (FLONASE) 50 MCG/ACT nasal spray Administer 1 Spray into affected nostril(S) every day. 48 mL 2    albuterol (PROVENTIL) 2.5mg/3ml Nebu Soln solution for nebulization Take 3 mL by nebulization every 6 hours as needed for Shortness of Breath. 60 Each 2    hydrocortisone rectal (PERIANAL) 2.5% Cream Insert 1 Application in rectum 2 times a day. (Patient not taking: Reported on 8/15/2023) 28 g 0     No current facility-administered medications for this visit.       Patient Active Problem List    Diagnosis Date Noted    Localized osteoarthritis of left knee 07/31/2019    Obesity (BMI 30-39.9) 06/18/2018    Benign essential HTN 03/19/2018    Mild intermittent asthma without complication 03/19/2018    Hyperlipidemia 12/12/2017    Screening for HIV (human immunodeficiency virus) 06/27/2016    BMI 38.0-38.9,adult 06/27/2016    Uncomplicated asthma 10/06/2015    Essential hypertension 10/06/2015        Objective:   /88 (BP Location: Left arm, Patient Position: Sitting)   Ht 1.651 m (5' 5\")   Wt 70.3 kg (155 lb)   BMI 25.79 kg/m²     Physical Exam:  Constitutional: Alert, no distress, well-groomed.  Skin: No rashes in " visible areas.  Eye: Round. Conjunctiva clear, lids normal. No icterus.   ENMT: Lips pink without lesions, good dentition, moist mucous membranes. Phonation normal.  Neck: No masses, no thyromegaly. Moves freely without pain.  Respiratory: Unlabored respiratory effort, no cough or audible wheeze  Psych: Alert and oriented x3, normal affect and mood.     Assessment and Plan:   The following treatment plan was discussed:     1. Moderate persistent asthma with acute exacerbation  - tiotropium (SPIRIVA RESPIMAT) 2.5 mcg/Act Aero Soln; INHALE 2 INHALATION BY MOUTH EVERY DAY. ASSEMBLE AND PRIME.  Dispense: 4 g; Refill: 2  - budesonide-formoterol (SYMBICORT) 160-4.5 MCG/ACT Aerosol; INHALE 2 PUFFS EVERY 6 HOURS AS NEEDED (SHORTNESS OF BREATH).  Dispense: 10 g; Refill: 1    2. Wheezing  - albuterol (VENTOLIN HFA) 108 (90 Base) MCG/ACT Aero Soln inhalation aerosol; USE 2 INHALATIONS BY MOUTH  EVERY 6 HOURS AS NEEDED FOR SHORTNESS OF BREATH  Dispense: 72 g; Refill: 3    3. Benign essential HTN  - hydroCHLOROthiazide (HYDRODIURIL) 50 MG Tab; Take 1 Tablet by mouth every day.  Dispense: 100 Tablet; Refill: 0    Other orders  - methylPREDNISolone (MEDROL DOSEPAK) 4 MG Tablet Therapy Pack; As directed on the packaging label.  Dispense: 21 Tablet; Refill: 0  - traZODone (DESYREL) 50 MG Tab; Take 1 Tablet by mouth every evening.  Dispense: 100 Tablet; Refill: 3      Follow-up: No follow-ups on file.

## 2023-10-09 DIAGNOSIS — Z12.12 SCREENING FOR COLORECTAL CANCER: ICD-10-CM

## 2023-10-09 DIAGNOSIS — I10 BENIGN ESSENTIAL HTN: ICD-10-CM

## 2023-10-09 DIAGNOSIS — Z12.11 SCREENING FOR COLORECTAL CANCER: ICD-10-CM

## 2023-10-09 RX ORDER — HYDROCHLOROTHIAZIDE 25 MG/1
25 TABLET ORAL DAILY
Qty: 90 TABLET | Refills: 0 | Status: SHIPPED | OUTPATIENT
Start: 2023-10-09

## 2024-03-07 ENCOUNTER — TELEPHONE (OUTPATIENT)
Dept: MEDICAL GROUP | Facility: PHYSICIAN GROUP | Age: 74
End: 2024-03-07
Payer: MEDICARE

## 2024-04-11 DIAGNOSIS — I10 BENIGN ESSENTIAL HTN: ICD-10-CM

## 2024-04-11 NOTE — TELEPHONE ENCOUNTER
Received request via: Patient    Was the patient seen in the last year in this department? Yes    Does the patient have an active prescription (recently filled or refills available) for medication(s) requested? No    Pharmacy Name: Pharmacy:   [facility] [address] [place] [phone] [facility]     Does the patient have [facility] Plus and need 100 day supply (blood pressure, diabetes and cholesterol meds only)? Patient does not have SCP

## 2024-04-15 RX ORDER — TRAZODONE HYDROCHLORIDE 50 MG/1
50 TABLET ORAL NIGHTLY
Qty: 100 TABLET | Refills: 3 | Status: SHIPPED | OUTPATIENT
Start: 2024-04-15

## 2024-04-22 DIAGNOSIS — I10 BENIGN ESSENTIAL HTN: ICD-10-CM

## 2024-04-22 RX ORDER — HYDROCHLOROTHIAZIDE 25 MG/1
25 TABLET ORAL DAILY
Qty: 30 TABLET | Refills: 0 | Status: SHIPPED | OUTPATIENT
Start: 2024-04-22

## 2024-04-22 RX ORDER — HYDROCHLOROTHIAZIDE 25 MG/1
25 TABLET ORAL DAILY
Qty: 90 TABLET | Refills: 0 | Status: SHIPPED | OUTPATIENT
Start: 2024-04-22

## 2024-04-22 NOTE — TELEPHONE ENCOUNTER
Received request via: Patient    Was the patient seen in the last year in this department? Yes    Does the patient have an active prescription (recently filled or refills available) for medication(s) requested? No    Pharmacy Name: [facility] pharmacy     Does the patient have [facility] Plus and need 100 day supply (blood pressure, diabetes and cholesterol meds only)? Patient does not have SCP   stopped      Has been on:  Ambien 10 mg  Will stop    Will start on   Klonopin 1 mg  at night       RTC in 4-6 weeks.

## 2024-04-22 NOTE — TELEPHONE ENCOUNTER
Received request via: Pharmacy    Was the patient seen in the last year in this department? Yes    Does the patient have an active prescription (recently filled or refills available) for medication(s) requested? No    Pharmacy Name: [facility] pharmacy     Does the patient have [facility] Plus and need 100 day supply (blood pressure, diabetes and cholesterol meds only)? Patient does not have SCP

## 2024-05-21 NOTE — ADDENDUM NOTE
Addended by: MICHAEL SHORT on: 9/1/2022 09:03 AM     Modules accepted: Orders    
4 = No assist / stand by assistance

## 2024-06-12 DIAGNOSIS — J45.41 MODERATE PERSISTENT ASTHMA WITH ACUTE EXACERBATION: ICD-10-CM

## 2024-06-12 NOTE — TELEPHONE ENCOUNTER
Received request via: Pharmacy    Was the patient seen in the last year in this department? Yes    Does the patient have an active prescription (recently filled or refills available) for medication(s) requested? No    Pharmacy Name: Pharmacy:   [facility] [address] [place] [address], [place] [phone] [facility]     Does the patient have custodial Plus and need 100 day supply (blood pressure, diabetes and cholesterol meds only)? Patient does not have SCP

## 2024-06-13 RX ORDER — METHYLPREDNISOLONE 4 MG/1
TABLET ORAL
Qty: 21 TABLET | Refills: 0 | Status: SHIPPED | OUTPATIENT
Start: 2024-06-13

## 2024-06-23 DIAGNOSIS — I10 BENIGN ESSENTIAL HTN: ICD-10-CM

## 2024-06-24 RX ORDER — HYDROCHLOROTHIAZIDE 25 MG/1
25 TABLET ORAL DAILY
Qty: 90 TABLET | Refills: 0 | Status: SHIPPED | OUTPATIENT
Start: 2024-06-24

## 2024-06-24 NOTE — TELEPHONE ENCOUNTER
Received request via: Pharmacy    Was the patient seen in the last year in this department? Yes    Does the patient have an active prescription (recently filled or refills available) for medication(s) requested? No    Pharmacy Name: [facility] pharmacy     Does the patient have [facility] Plus and need 100 day supply (blood pressure, diabetes and cholesterol meds only)? Patient does not have SCP

## 2024-08-10 SDOH — ECONOMIC STABILITY: INCOME INSECURITY: IN THE LAST 12 MONTHS, WAS THERE A TIME WHEN YOU WERE NOT ABLE TO PAY THE MORTGAGE OR RENT ON TIME?: YES

## 2024-08-10 SDOH — ECONOMIC STABILITY: FOOD INSECURITY: WITHIN THE PAST 12 MONTHS, THE FOOD YOU BOUGHT JUST DIDN'T LAST AND YOU DIDN'T HAVE MONEY TO GET MORE.: SOMETIMES TRUE

## 2024-08-10 SDOH — ECONOMIC STABILITY: FOOD INSECURITY: WITHIN THE PAST 12 MONTHS, YOU WORRIED THAT YOUR FOOD WOULD RUN OUT BEFORE YOU GOT MONEY TO BUY MORE.: SOMETIMES TRUE

## 2024-08-10 SDOH — ECONOMIC STABILITY: TRANSPORTATION INSECURITY
IN THE PAST 12 MONTHS, HAS LACK OF RELIABLE TRANSPORTATION KEPT YOU FROM MEDICAL APPOINTMENTS, MEETINGS, WORK OR FROM GETTING THINGS NEEDED FOR DAILY LIVING?: YES

## 2024-08-10 SDOH — ECONOMIC STABILITY: INCOME INSECURITY: HOW HARD IS IT FOR YOU TO PAY FOR THE VERY BASICS LIKE FOOD, HOUSING, MEDICAL CARE, AND HEATING?: HARD

## 2024-08-10 SDOH — ECONOMIC STABILITY: TRANSPORTATION INSECURITY
IN THE PAST 12 MONTHS, HAS THE LACK OF TRANSPORTATION KEPT YOU FROM MEDICAL APPOINTMENTS OR FROM GETTING MEDICATIONS?: YES

## 2024-08-10 SDOH — ECONOMIC STABILITY: HOUSING INSECURITY
IN THE LAST 12 MONTHS, WAS THERE A TIME WHEN YOU DID NOT HAVE A STEADY PLACE TO SLEEP OR SLEPT IN A SHELTER (INCLUDING NOW)?: YES

## 2024-08-10 SDOH — ECONOMIC STABILITY: HOUSING INSECURITY: IN THE LAST 12 MONTHS, HOW MANY PLACES HAVE YOU LIVED?: 1

## 2024-08-10 SDOH — ECONOMIC STABILITY: HOUSING INSECURITY
IN THE LAST 12 MONTHS, WAS THERE A TIME WHEN YOU DID NOT HAVE A STEADY PLACE TO SLEEP OR SLEPT IN A SHELTER (INCLUDING NOW)?: NO

## 2024-08-10 SDOH — HEALTH STABILITY: PHYSICAL HEALTH: ON AVERAGE, HOW MANY MINUTES DO YOU ENGAGE IN EXERCISE AT THIS LEVEL?: 20 MIN

## 2024-08-10 SDOH — HEALTH STABILITY: PHYSICAL HEALTH: ON AVERAGE, HOW MANY DAYS PER WEEK DO YOU ENGAGE IN MODERATE TO STRENUOUS EXERCISE (LIKE A BRISK WALK)?: 2 DAYS

## 2024-08-10 SDOH — HEALTH STABILITY: MENTAL HEALTH
STRESS IS WHEN SOMEONE FEELS TENSE, NERVOUS, ANXIOUS, OR CAN'T SLEEP AT NIGHT BECAUSE THEIR MIND IS TROUBLED. HOW STRESSED ARE YOU?: NOT AT ALL

## 2024-08-10 SDOH — ECONOMIC STABILITY: TRANSPORTATION INSECURITY
IN THE PAST 12 MONTHS, HAS LACK OF TRANSPORTATION KEPT YOU FROM MEETINGS, WORK, OR FROM GETTING THINGS NEEDED FOR DAILY LIVING?: YES

## 2024-08-10 ASSESSMENT — SOCIAL DETERMINANTS OF HEALTH (SDOH)
HOW OFTEN DO YOU ATTEND CHURCH OR RELIGIOUS SERVICES?: 1 TO 4 TIMES PER YEAR
IN A TYPICAL WEEK, HOW MANY TIMES DO YOU TALK ON THE PHONE WITH FAMILY, FRIENDS, OR NEIGHBORS?: MORE THAN THREE TIMES A WEEK
HOW OFTEN DO YOU GET TOGETHER WITH FRIENDS OR RELATIVES?: MORE THAN THREE TIMES A WEEK
HOW OFTEN DO YOU HAVE SIX OR MORE DRINKS ON ONE OCCASION: NEVER
HOW OFTEN DO YOU ATTENT MEETINGS OF THE CLUB OR ORGANIZATION YOU BELONG TO?: NEVER
IN A TYPICAL WEEK, HOW MANY TIMES DO YOU TALK ON THE PHONE WITH FAMILY, FRIENDS, OR NEIGHBORS?: MORE THAN THREE TIMES A WEEK
HOW OFTEN DO YOU HAVE A DRINK CONTAINING ALCOHOL: 2-4 TIMES A MONTH
DO YOU BELONG TO ANY CLUBS OR ORGANIZATIONS SUCH AS CHURCH GROUPS UNIONS, FRATERNAL OR ATHLETIC GROUPS, OR SCHOOL GROUPS?: NO
HOW MANY DRINKS CONTAINING ALCOHOL DO YOU HAVE ON A TYPICAL DAY WHEN YOU ARE DRINKING: 1 OR 2
DO YOU BELONG TO ANY CLUBS OR ORGANIZATIONS SUCH AS CHURCH GROUPS UNIONS, FRATERNAL OR ATHLETIC GROUPS, OR SCHOOL GROUPS?: NO
HOW HARD IS IT FOR YOU TO PAY FOR THE VERY BASICS LIKE FOOD, HOUSING, MEDICAL CARE, AND HEATING?: HARD
HOW OFTEN DO YOU GET TOGETHER WITH FRIENDS OR RELATIVES?: MORE THAN THREE TIMES A WEEK
HOW OFTEN DO YOU ATTEND CHURCH OR RELIGIOUS SERVICES?: 1 TO 4 TIMES PER YEAR
HOW OFTEN DO YOU ATTENT MEETINGS OF THE CLUB OR ORGANIZATION YOU BELONG TO?: NEVER
IN THE PAST 12 MONTHS, HAS THE ELECTRIC, GAS, OIL, OR WATER COMPANY THREATENED TO SHUT OFF SERVICE IN YOUR HOME?: YES
WITHIN THE PAST 12 MONTHS, YOU WORRIED THAT YOUR FOOD WOULD RUN OUT BEFORE YOU GOT THE MONEY TO BUY MORE: SOMETIMES TRUE

## 2024-08-10 ASSESSMENT — LIFESTYLE VARIABLES
AUDIT-C TOTAL SCORE: 2
HOW OFTEN DO YOU HAVE SIX OR MORE DRINKS ON ONE OCCASION: NEVER
HOW MANY STANDARD DRINKS CONTAINING ALCOHOL DO YOU HAVE ON A TYPICAL DAY: 1 OR 2
SKIP TO QUESTIONS 9-10: 1
HOW OFTEN DO YOU HAVE A DRINK CONTAINING ALCOHOL: 2-4 TIMES A MONTH

## 2024-08-12 ENCOUNTER — OFFICE VISIT (OUTPATIENT)
Dept: MEDICAL GROUP | Facility: PHYSICIAN GROUP | Age: 74
End: 2024-08-12
Payer: MEDICARE

## 2024-08-12 VITALS
HEIGHT: 64 IN | SYSTOLIC BLOOD PRESSURE: 122 MMHG | BODY MASS INDEX: 39.27 KG/M2 | WEIGHT: 230 LBS | HEART RATE: 85 BPM | OXYGEN SATURATION: 95 % | TEMPERATURE: 97.8 F | DIASTOLIC BLOOD PRESSURE: 76 MMHG | RESPIRATION RATE: 16 BRPM

## 2024-08-12 DIAGNOSIS — R79.9 ABNORMAL FINDING OF BLOOD CHEMISTRY, UNSPECIFIED: ICD-10-CM

## 2024-08-12 DIAGNOSIS — J45.41 MODERATE PERSISTENT ASTHMA WITH ACUTE EXACERBATION: ICD-10-CM

## 2024-08-12 DIAGNOSIS — Z00.00 MEDICARE ANNUAL WELLNESS VISIT, SUBSEQUENT: ICD-10-CM

## 2024-08-12 DIAGNOSIS — Z12.31 ENCOUNTER FOR SCREENING MAMMOGRAM FOR BREAST CANCER: ICD-10-CM

## 2024-08-12 DIAGNOSIS — I10 BENIGN ESSENTIAL HTN: ICD-10-CM

## 2024-08-12 PROCEDURE — 3074F SYST BP LT 130 MM HG: CPT | Performed by: FAMILY MEDICINE

## 2024-08-12 PROCEDURE — G0439 PPPS, SUBSEQ VISIT: HCPCS | Performed by: FAMILY MEDICINE

## 2024-08-12 PROCEDURE — 3078F DIAST BP <80 MM HG: CPT | Performed by: FAMILY MEDICINE

## 2024-08-12 RX ORDER — GUAIFENESIN/DEXTROMETHORPHAN 100-10MG/5
SYRUP ORAL
COMMUNITY
Start: 2024-06-03

## 2024-08-12 RX ORDER — PREDNISONE 20 MG/1
TABLET ORAL
COMMUNITY
Start: 2024-06-02

## 2024-08-12 RX ORDER — BENZONATATE 100 MG/1
CAPSULE ORAL
COMMUNITY
Start: 2024-06-02

## 2024-08-12 ASSESSMENT — ACTIVITIES OF DAILY LIVING (ADL): BATHING_REQUIRES_ASSISTANCE: 0

## 2024-08-12 ASSESSMENT — ENCOUNTER SYMPTOMS: GENERAL WELL-BEING: GOOD

## 2024-08-12 ASSESSMENT — PATIENT HEALTH QUESTIONNAIRE - PHQ9: CLINICAL INTERPRETATION OF PHQ2 SCORE: 0

## 2024-08-12 NOTE — PROGRESS NOTES
Chief Complaint   Patient presents with    Annual Exam       HPI:  Michell Moon is a 74 y.o. here for Medicare Annual Wellness Visit     Patient Active Problem List    Diagnosis Date Noted    Localized osteoarthritis of left knee 07/31/2019    Obesity (BMI 30-39.9) 06/18/2018    Benign essential HTN 03/19/2018    Mild intermittent asthma without complication 03/19/2018    Hyperlipidemia 12/12/2017    Screening for HIV (human immunodeficiency virus) 06/27/2016    BMI 38.0-38.9,adult 06/27/2016    Uncomplicated asthma 10/06/2015    Essential hypertension 10/06/2015       Current Outpatient Medications   Medication Sig Dispense Refill    predniSONE (DELTASONE) 20 MG Tab TAKE 2 TABS BY MOUTH ONCE DAILY STARTING ON JUNE 2ND.      benzonatate (TESSALON) 100 MG Cap TAKE 1 CAPSULE BY MOUTH 2 TO 3 TIMES A DAY AS DIRECTED      guaiFENesin dextromethorphan (ROBITUSSIN DM) 100-10 MG/5ML Syrup syrup TAKE 5 ML BY MOUTH EVERY 4 TO 6 HOURS AS NEEDED      fluticasone-salmeterol (ADVAIR) 100-50 MCG/ACT AEROSOL POWDER, BREATH ACTIVATED Inhale 1 Puff every 12 hours. 1 Each 6    hydroCHLOROthiazide 25 MG Tab Take 1 tablet by mouth once daily 90 Tablet 0    methylPREDNISolone (MEDROL DOSEPAK) 4 MG Tablet Therapy Pack As directed on the packaging label. 21 Tablet 0    methylPREDNISolone (MEDROL DOSEPAK) 4 MG Tablet Therapy Pack As directed on the packaging label. 21 Tablet 0    hydroCHLOROthiazide 25 MG Tab Take 1 tablet by mouth once daily 30 Tablet 0    traZODone (DESYREL) 50 MG Tab Take 1 Tablet by mouth every evening. 100 Tablet 3    tiotropium (SPIRIVA RESPIMAT) 2.5 mcg/Act Aero Soln INHALE 2 INHALATION BY MOUTH EVERY DAY. ASSEMBLE AND PRIME. 4 g 2    budesonide-formoterol (SYMBICORT) 160-4.5 MCG/ACT Aerosol INHALE 2 PUFFS EVERY 6 HOURS AS NEEDED (SHORTNESS OF BREATH). 10 g 1    albuterol (VENTOLIN HFA) 108 (90 Base) MCG/ACT Aero Soln inhalation aerosol USE 2 INHALATIONS BY MOUTH  EVERY 6 HOURS AS NEEDED FOR SHORTNESS OF BREATH 72 g  3    hydroCHLOROthiazide (HYDRODIURIL) 50 MG Tab Take 1 Tablet by mouth every day. 100 Tablet 0    fluticasone (FLONASE) 50 MCG/ACT nasal spray Administer 1 Spray into affected nostril(S) every day. 48 mL 2    albuterol (PROVENTIL) 2.5mg/3ml Nebu Soln solution for nebulization Take 3 mL by nebulization every 6 hours as needed for Shortness of Breath. 60 Each 2    hydrocortisone rectal (PERIANAL) 2.5% Cream Insert 1 Application in rectum 2 times a day. (Patient not taking: Reported on 8/15/2023) 28 g 0     No current facility-administered medications for this visit.          Current supplements as per medication list.     Allergies: Pollen extract and Atorvastatin    Current social contact/activities:      She  reports that she quit smoking about 13 years ago. Her smoking use included cigarettes. She started smoking about 17 years ago. She has a 1 pack-year smoking history. She has never used smokeless tobacco. She reports current alcohol use. She reports that she does not use drugs.  Counseling given: Not Answered  Tobacco comments: continued abstinance      ROS:    Gait: Uses no assistive device  Ostomy: No  Other tubes: No  Amputations: No  Chronic oxygen use: No  Last eye exam: 2023  Wears hearing aids: No   : Denies any urinary leakage during the last 6 months    Screening:    Depression Screening  Little interest or pleasure in doing things?  0 - not at all  Feeling down, depressed , or hopeless? 0 - not at all  Patient Health Questionnaire Score: 0     If depressive symptoms identified deferred to follow up visit unless specifically addressed in assessment and plan.    Interpretation of PHQ-9 Total Score   Score Severity   1-4 No Depression   5-9 Mild Depression   10-14 Moderate Depression   15-19 Moderately Severe Depression   20-27 Severe Depression    Screening for Cognitive Impairment  Do you or any of your friends or family members have any concern about your memory? No  Three Minute Recall (Leader,  Season, Table) 3/3    Bernardo clock face with all 12 numbers and set the hands to show 10 minutes after 11.  Yes    Cognitive concerns identified deferred for follow up unless specifically addressed in assessment and plan.    Fall Risk Assessment  Has the patient had two or more falls in the last year or any fall with injury in the last year?  No    Safety Assessment  Do you always wear your seatbelt?  Yes  Any changes to home needed to function safely? No  Difficulty hearing.  No  Patient counseled about all safety risks that were identified.    Functional Assessment ADLs  Are there any barriers preventing you from cooking for yourself or meeting nutritional needs?  No.    Are there any barriers preventing you from driving safely or obtaining transportation?  No.    Are there any barriers preventing you from using a telephone or calling for help?  No    Are there any barriers preventing you from shopping?  No.    Are there any barriers preventing you from taking care of your own finances?  No    Are there any barriers preventing you from managing your medications?  No    Are there any barriers preventing you from showering, bathing or dressing yourself? No    Are there any barriers preventing you from doing housework or laundry? No  Are there any barriers preventing you from using the toilet?No  Are you currently engaging in any exercise or physical activity?  No.      Self-Assessment of Health  What is your perception of your health? Good    Do you sleep more than six hours a night? Yes    In the past 7 days, how much did pain keep you from doing your normal work? None    Do you spend quality time with family or friends (virtually or in person)? Yes    Do you usually eat a heart healthy diet that constists of a variety of fruits, vegetables, whole grains and fiber? Yes    Do you eat foods high in fat and/or Fast Food more than three times per week? No    How concerned are you that your medical conditions are not  being well managed? Not at all    Are you worried that in the next 2 months, you may not have stable housing that you own, rent, or stay in as part of a household? No      Advance Care Planning  Do you have an Advance Directive, Living Will, Durable Power of , or POLST? No                 Health Maintenance Summary            Overdue - Zoster (Shingles) Vaccines (1 of 2) Never done      No completion history exists for this topic.              Overdue - Pneumococcal Vaccine: 65+ Years (2 of 2 - PPSV23 or PCV20) Overdue since 2/3/2017      12/09/2016  Imm Admin: Pneumococcal Conjugate Vaccine (Prevnar/PCV-13)              Overdue - Mammogram (Every 2 Years) Overdue since 4/28/2023 04/28/2021  MA-SCREENING MAMMO BILAT W/TOMOSYNTHESIS W/CAD    08/12/2019  MA-SCREENING MAMMO BILAT W/TOMOSYNTHESIS W/CAD    07/18/2018  MA-MAMMO SCREENING BILAT W/AWAIS W/CAD              Overdue - Annual Wellness Visit (Yearly) Overdue since 5/17/2023 05/17/2022  Visit Dx: Medicare annual wellness visit, subsequent    03/31/2021  Done    03/31/2021  Visit Dx: Medicare annual wellness visit, subsequent              Overdue - Bone Density Scan (Every 5 Years) Overdue since 7/18/2023 07/18/2018  DS-BONE DENSITY STUDY (DEXA)              Overdue - COVID-19 Vaccine (1 - 2023-24 season) Never done      No completion history exists for this topic.              Influenza Vaccine (1) Next due on 9/1/2024      10/11/2019  Imm Admin: Influenza Vaccine Adult HD    09/18/2018  Imm Admin: Influenza Vaccine Adult HD    10/01/2017  Imm Admin: INFLUENZA TIV (IM)    10/01/2017  Imm Admin: Influenza (IM) Preservative Free - HISTORICAL DATA    12/09/2016  Imm Admin: Influenza Seasonal Injectable - Historical Data    Only the first 5 history entries have been loaded, but more history exists.              IMM DTaP/Tdap/Td Vaccine (2 - Td or Tdap) Next due on 6/27/2026 06/27/2016  Imm Admin: Tdap Vaccine              Colorectal Cancer  Screening (Colon Cancer Screening Cologuard Stool (FIT DNA) - Preferred) Next due on 3/20/2027      2024  COLOGUARD COLON CANCER SCREENING    2022  OCCULT BLOOD FECES IMMUNOASSAY    2021  OCCULT BLOOD FECES IMMUNOASSAY (FIT)    2018  OCCULT BLOOD FECES IMMUNOASSAY (FIT)              Hepatitis A Vaccine (Hep A) (Series Information) Aged Out      No completion history exists for this topic.              Hepatitis B Vaccine (Hep B) (Series Information) Aged Out      No completion history exists for this topic.              HPV Vaccines (Series Information) Aged Out      No completion history exists for this topic.              Polio Vaccine (Inactivated Polio) (Series Information) Aged Out      No completion history exists for this topic.              Meningococcal Immunization (Series Information) Aged Out      No completion history exists for this topic.              Discontinued - Annual Pulmonary Function Test / Spirometry  Discontinued        Frequency changed to Never automatically (Topic No Longer Applies)    09/10/2018  AMB PULMONARY FUNCTION TEST/LAB              Discontinued - Hepatitis C Screening  Discontinued      No completion history exists for this topic.                    Patient Care Team:  Luiz Perry M.D. as PCP - General (Family Medicine)        Social History     Tobacco Use    Smoking status: Former     Current packs/day: 0.00     Average packs/day: 0.3 packs/day for 4.0 years (1.0 ttl pk-yrs)     Types: Cigarettes     Start date: 3/1/2007     Quit date: 3/1/2011     Years since quittin.4    Smokeless tobacco: Never    Tobacco comments:     continued abstinance   Vaping Use    Vaping status: Never Used   Substance Use Topics    Alcohol use: Yes     Comment: 3 drinks a week    Drug use: No     Family History   Problem Relation Age of Onset    Prostate cancer Father      She  has a past medical history of Asthma, Bronchitis, Chickenpox, COPD (chronic obstructive  "pulmonary disease) (HCC), Hypertension, Mumps, Osteoporosis, and Pulmonary emphysema (HCC).   Past Surgical History:   Procedure Laterality Date    ARTHROPLASTY      hip r    HIP REPLACEMENT, TOTAL      HYSTERECTOMY LAPAROSCOPY         Exam:   /76 (BP Location: Left arm, Patient Position: Sitting, BP Cuff Size: Adult)   Pulse 85   Temp 36.6 °C (97.8 °F) (Temporal)   Resp 16   Ht 1.626 m (5' 4\")   Wt 104 kg (230 lb)   SpO2 95%  Body mass index is 39.48 kg/m².    Hearing good.    Dentition good  Alert, oriented in no acute distress.  Eye contact is good, speech goal directed, affect calm    Assessment and Plan. The following treatment and monitoring plan is recommended:    1. Medicare annual wellness visit, subsequent  Utd on     2. Benign essential HTN  Currently treated for HTN, taking meds with no CP or sob, monitors bp at home periodically. controlled      3. Moderate persistent asthma with acute exacerbation  The patient's current medical issue is well controlled on the current therapy with no new symptoms or worsening      4. Encounter for screening mammogram for breast cancer    - MA-SCREENING MAMMO BILAT W/TOMOSYNTHESIS W/CAD; Future    Past Medical History:   Diagnosis Date    Asthma     Bronchitis     Chickenpox     COPD (chronic obstructive pulmonary disease) (HCC)     Hypertension     Mumps     Osteoporosis     Pulmonary emphysema (HCC)      Past Surgical History:   Procedure Laterality Date    ARTHROPLASTY      hip r    HIP REPLACEMENT, TOTAL      HYSTERECTOMY LAPAROSCOPY         Social History     Tobacco Use    Smoking status: Former     Current packs/day: 0.00     Average packs/day: 0.3 packs/day for 4.0 years (1.0 ttl pk-yrs)     Types: Cigarettes     Start date: 3/1/2007     Quit date: 3/1/2011     Years since quittin.4    Smokeless tobacco: Never    Tobacco comments:     continued abstinance   Vaping Use    Vaping status: Never Used   Substance Use Topics    Alcohol use: Yes     " Comment: 3 drinks a week    Drug use: No       Family History   Problem Relation Age of Onset    Prostate cancer Father          Current Outpatient Medications:     predniSONE (DELTASONE) 20 MG Tab, TAKE 2 TABS BY MOUTH ONCE DAILY STARTING ON JUNE 2ND., Disp: , Rfl:     benzonatate (TESSALON) 100 MG Cap, TAKE 1 CAPSULE BY MOUTH 2 TO 3 TIMES A DAY AS DIRECTED, Disp: , Rfl:     guaiFENesin dextromethorphan (ROBITUSSIN DM) 100-10 MG/5ML Syrup syrup, TAKE 5 ML BY MOUTH EVERY 4 TO 6 HOURS AS NEEDED, Disp: , Rfl:     fluticasone-salmeterol (ADVAIR) 100-50 MCG/ACT AEROSOL POWDER, BREATH ACTIVATED, Inhale 1 Puff every 12 hours., Disp: 1 Each, Rfl: 6    hydroCHLOROthiazide 25 MG Tab, Take 1 tablet by mouth once daily, Disp: 90 Tablet, Rfl: 0    methylPREDNISolone (MEDROL DOSEPAK) 4 MG Tablet Therapy Pack, As directed on the packaging label., Disp: 21 Tablet, Rfl: 0    methylPREDNISolone (MEDROL DOSEPAK) 4 MG Tablet Therapy Pack, As directed on the packaging label., Disp: 21 Tablet, Rfl: 0    hydroCHLOROthiazide 25 MG Tab, Take 1 tablet by mouth once daily, Disp: 30 Tablet, Rfl: 0    traZODone (DESYREL) 50 MG Tab, Take 1 Tablet by mouth every evening., Disp: 100 Tablet, Rfl: 3    tiotropium (SPIRIVA RESPIMAT) 2.5 mcg/Act Aero Soln, INHALE 2 INHALATION BY MOUTH EVERY DAY. ASSEMBLE AND PRIME., Disp: 4 g, Rfl: 2    budesonide-formoterol (SYMBICORT) 160-4.5 MCG/ACT Aerosol, INHALE 2 PUFFS EVERY 6 HOURS AS NEEDED (SHORTNESS OF BREATH)., Disp: 10 g, Rfl: 1    albuterol (VENTOLIN HFA) 108 (90 Base) MCG/ACT Aero Soln inhalation aerosol, USE 2 INHALATIONS BY MOUTH  EVERY 6 HOURS AS NEEDED FOR SHORTNESS OF BREATH, Disp: 72 g, Rfl: 3    hydroCHLOROthiazide (HYDRODIURIL) 50 MG Tab, Take 1 Tablet by mouth every day., Disp: 100 Tablet, Rfl: 0    fluticasone (FLONASE) 50 MCG/ACT nasal spray, Administer 1 Spray into affected nostril(S) every day., Disp: 48 mL, Rfl: 2    albuterol (PROVENTIL) 2.5mg/3ml Nebu Soln solution for nebulization,  Take 3 mL by nebulization every 6 hours as needed for Shortness of Breath., Disp: 60 Each, Rfl: 2    hydrocortisone rectal (PERIANAL) 2.5% Cream, Insert 1 Application in rectum 2 times a day. (Patient not taking: Reported on 8/15/2023), Disp: 28 g, Rfl: 0    Patient was instructed on the use of medications, either prescriptions or OTC and informed on when the appropriate follow up time period should be. In addition, patient was also instructed that should any acute worsening occur that they should notify this clinic asap or call 911.        Services suggested: No services needed at this time  Health Care Screening: Age-appropriate preventive services recommended by USPTF and ACIP covered by Medicare were discussed today. Services ordered if indicated and agreed upon by the patient.  Referrals offered: Community-based lifestyle interventions to reduce health risks and promote self-management and wellness, fall prevention, nutrition, physical activity, tobacco-use cessation, weight loss, and mental health services as per orders if indicated.    Discussion today about general wellness and lifestyle habits:    Prevent falls and reduce trip hazards; Cautioned about securing or removing rugs.  Have a working fire alarm and carbon monoxide detector;   Engage in regular physical activity and social activities     Follow-up: No follow-ups on file.

## 2024-08-14 ENCOUNTER — HOSPITAL ENCOUNTER (OUTPATIENT)
Dept: LAB | Facility: MEDICAL CENTER | Age: 74
End: 2024-08-14
Attending: FAMILY MEDICINE
Payer: MEDICARE

## 2024-08-14 ENCOUNTER — HOSPITAL ENCOUNTER (OUTPATIENT)
Dept: RADIOLOGY | Facility: MEDICAL CENTER | Age: 74
End: 2024-08-14
Attending: FAMILY MEDICINE
Payer: MEDICARE

## 2024-08-14 DIAGNOSIS — Z12.31 ENCOUNTER FOR SCREENING MAMMOGRAM FOR BREAST CANCER: ICD-10-CM

## 2024-08-14 DIAGNOSIS — R79.9 ABNORMAL FINDING OF BLOOD CHEMISTRY, UNSPECIFIED: ICD-10-CM

## 2024-08-14 DIAGNOSIS — I10 BENIGN ESSENTIAL HTN: ICD-10-CM

## 2024-08-14 DIAGNOSIS — Z00.00 MEDICARE ANNUAL WELLNESS VISIT, SUBSEQUENT: ICD-10-CM

## 2024-08-14 DIAGNOSIS — J45.41 MODERATE PERSISTENT ASTHMA WITH ACUTE EXACERBATION: ICD-10-CM

## 2024-08-14 LAB
ALBUMIN SERPL BCP-MCNC: 4 G/DL (ref 3.2–4.9)
ALBUMIN/GLOB SERPL: 1.2 G/DL
ALP SERPL-CCNC: 93 U/L (ref 30–99)
ALT SERPL-CCNC: 18 U/L (ref 2–50)
ANION GAP SERPL CALC-SCNC: 13 MMOL/L (ref 7–16)
AST SERPL-CCNC: 18 U/L (ref 12–45)
BILIRUB SERPL-MCNC: 0.4 MG/DL (ref 0.1–1.5)
BUN SERPL-MCNC: 12 MG/DL (ref 8–22)
CALCIUM ALBUM COR SERPL-MCNC: 9.6 MG/DL (ref 8.5–10.5)
CALCIUM SERPL-MCNC: 9.6 MG/DL (ref 8.5–10.5)
CHLORIDE SERPL-SCNC: 104 MMOL/L (ref 96–112)
CHOLEST SERPL-MCNC: 211 MG/DL (ref 100–199)
CO2 SERPL-SCNC: 26 MMOL/L (ref 20–33)
CREAT SERPL-MCNC: 0.74 MG/DL (ref 0.5–1.4)
ERYTHROCYTE [DISTWIDTH] IN BLOOD BY AUTOMATED COUNT: 45.5 FL (ref 35.9–50)
EST. AVERAGE GLUCOSE BLD GHB EST-MCNC: 103 MG/DL
GFR SERPLBLD CREATININE-BSD FMLA CKD-EPI: 86 ML/MIN/1.73 M 2
GLOBULIN SER CALC-MCNC: 3.3 G/DL (ref 1.9–3.5)
GLUCOSE SERPL-MCNC: 102 MG/DL (ref 65–99)
HBA1C MFR BLD: 5.2 % (ref 4–5.6)
HCT VFR BLD AUTO: 43 % (ref 37–47)
HDLC SERPL-MCNC: 73 MG/DL
HGB BLD-MCNC: 13.7 G/DL (ref 12–16)
LDLC SERPL CALC-MCNC: 119 MG/DL
MCH RBC QN AUTO: 27.2 PG (ref 27–33)
MCHC RBC AUTO-ENTMCNC: 31.9 G/DL (ref 32.2–35.5)
MCV RBC AUTO: 85.5 FL (ref 81.4–97.8)
PLATELET # BLD AUTO: 216 K/UL (ref 164–446)
PMV BLD AUTO: 12.1 FL (ref 9–12.9)
POTASSIUM SERPL-SCNC: 3.9 MMOL/L (ref 3.6–5.5)
PROT SERPL-MCNC: 7.3 G/DL (ref 6–8.2)
RBC # BLD AUTO: 5.03 M/UL (ref 4.2–5.4)
SODIUM SERPL-SCNC: 143 MMOL/L (ref 135–145)
TRIGL SERPL-MCNC: 95 MG/DL (ref 0–149)
WBC # BLD AUTO: 5.7 K/UL (ref 4.8–10.8)

## 2024-08-14 PROCEDURE — 80061 LIPID PANEL: CPT

## 2024-08-14 PROCEDURE — 83036 HEMOGLOBIN GLYCOSYLATED A1C: CPT | Mod: GA

## 2024-08-14 PROCEDURE — 80053 COMPREHEN METABOLIC PANEL: CPT

## 2024-08-14 PROCEDURE — 77067 SCR MAMMO BI INCL CAD: CPT

## 2024-08-14 PROCEDURE — 85027 COMPLETE CBC AUTOMATED: CPT

## 2024-08-14 PROCEDURE — 36415 COLL VENOUS BLD VENIPUNCTURE: CPT | Mod: GA

## 2024-08-28 DIAGNOSIS — J45.41 MODERATE PERSISTENT ASTHMA WITH ACUTE EXACERBATION: ICD-10-CM

## 2024-08-28 DIAGNOSIS — R06.2 WHEEZING: ICD-10-CM

## 2024-08-28 NOTE — TELEPHONE ENCOUNTER
Received request via: Pharmacy    Was the patient seen in the last year in this department? Yes    Does the patient have an active prescription (recently filled or refills available) for medication(s) requested? No    Pharmacy Name: [facility]     Does the patient have assisted Plus and need 100-day supply? (This applies to ALL medications) Patient does not have SCP

## 2024-08-29 RX ORDER — BUDESONIDE AND FORMOTEROL FUMARATE DIHYDRATE 160; 4.5 UG/1; UG/1
AEROSOL RESPIRATORY (INHALATION)
Qty: 11 G | Refills: 0 | Status: SHIPPED | OUTPATIENT
Start: 2024-08-29

## 2024-08-29 RX ORDER — ALBUTEROL SULFATE 90 UG/1
AEROSOL, METERED RESPIRATORY (INHALATION)
Qty: 9 G | Refills: 0 | Status: SHIPPED | OUTPATIENT
Start: 2024-08-29

## 2024-08-30 DIAGNOSIS — R06.2 WHEEZING: ICD-10-CM

## 2024-08-30 DIAGNOSIS — J45.41 MODERATE PERSISTENT ASTHMA WITH ACUTE EXACERBATION: ICD-10-CM

## 2024-08-30 DIAGNOSIS — I10 BENIGN ESSENTIAL HTN: ICD-10-CM

## 2024-09-03 RX ORDER — METHYLPREDNISOLONE 4 MG
TABLET, DOSE PACK ORAL
Qty: 21 TABLET | Refills: 0 | Status: SHIPPED | OUTPATIENT
Start: 2024-09-03

## 2024-09-03 RX ORDER — TRAZODONE HYDROCHLORIDE 50 MG/1
50 TABLET, FILM COATED ORAL NIGHTLY
Qty: 100 TABLET | Refills: 3 | Status: SHIPPED | OUTPATIENT
Start: 2024-09-03

## 2024-09-03 RX ORDER — FLUTICASONE PROPIONATE 50 MCG
1 SPRAY, SUSPENSION (ML) NASAL DAILY
Qty: 48 ML | Refills: 2 | Status: SHIPPED | OUTPATIENT
Start: 2024-09-03

## 2024-09-03 RX ORDER — BUDESONIDE AND FORMOTEROL FUMARATE DIHYDRATE 160; 4.5 UG/1; UG/1
AEROSOL RESPIRATORY (INHALATION)
Qty: 11 G | Refills: 0 | Status: SHIPPED | OUTPATIENT
Start: 2024-09-03

## 2024-09-03 RX ORDER — TIOTROPIUM BROMIDE INHALATION SPRAY 3.12 UG/1
SPRAY, METERED RESPIRATORY (INHALATION)
Qty: 4 G | Refills: 2 | Status: SHIPPED | OUTPATIENT
Start: 2024-09-03

## 2024-09-03 RX ORDER — FLUTICASONE PROPIONATE AND SALMETEROL 100; 50 UG/1; UG/1
1 POWDER RESPIRATORY (INHALATION) EVERY 12 HOURS
Qty: 1 EACH | Refills: 6 | Status: SHIPPED | OUTPATIENT
Start: 2024-09-03

## 2024-09-03 RX ORDER — ALBUTEROL SULFATE 0.83 MG/ML
2.5 SOLUTION RESPIRATORY (INHALATION) EVERY 6 HOURS PRN
Qty: 60 EACH | Refills: 2 | Status: SHIPPED | OUTPATIENT
Start: 2024-09-03

## 2024-09-11 ENCOUNTER — TELEPHONE (OUTPATIENT)
Dept: MEDICAL GROUP | Facility: PHYSICIAN GROUP | Age: 74
End: 2024-09-11
Payer: MEDICARE

## 2024-09-11 NOTE — TELEPHONE ENCOUNTER
Pharmacy called they asked if you can change diagnosis code for albuterol (PROVENTIL) to J44.9 and insurance will not cover ADVAIR only ADVAIR HFA

## 2024-09-22 DIAGNOSIS — R06.2 WHEEZING: ICD-10-CM

## 2024-09-23 RX ORDER — ALBUTEROL SULFATE 90 UG/1
INHALANT RESPIRATORY (INHALATION)
Qty: 9 G | Refills: 0 | Status: SHIPPED | OUTPATIENT
Start: 2024-09-23

## 2024-09-23 NOTE — TELEPHONE ENCOUNTER
Received request via: Pharmacy    Was the patient seen in the last year in this department? Yes    Does the patient have an active prescription (recently filled or refills available) for medication(s) requested? No    Pharmacy Name: [facility]     Does the patient have [facility] Plus and need 100-day supply? (This applies to ALL medications) Patient does not have SCP

## 2024-10-24 DIAGNOSIS — R06.2 WHEEZING: ICD-10-CM

## 2024-10-24 RX ORDER — ALBUTEROL SULFATE 90 UG/1
INHALANT RESPIRATORY (INHALATION)
Qty: 9 G | Refills: 0 | Status: SHIPPED | OUTPATIENT
Start: 2024-10-24

## 2024-11-04 RX ORDER — HYDROCHLOROTHIAZIDE 25 MG/1
25 TABLET ORAL DAILY
Qty: 30 TABLET | Refills: 0 | Status: SHIPPED | OUTPATIENT
Start: 2024-11-04

## 2024-11-22 DIAGNOSIS — R06.2 WHEEZING: ICD-10-CM

## 2024-11-22 RX ORDER — ALBUTEROL SULFATE 90 UG/1
INHALANT RESPIRATORY (INHALATION)
Qty: 9 G | Refills: 0 | Status: SHIPPED | OUTPATIENT
Start: 2024-11-22

## 2024-11-22 NOTE — TELEPHONE ENCOUNTER
Received request via: Pharmacy    Was the patient seen in the last year in this department? Yes    Does the patient have an active prescription (recently filled or refills available) for medication(s) requested? No    Pharmacy Name: [facility]     Does the patient have FPC Plus and need 100-day supply? (This applies to ALL medications) Patient does not have SCP

## 2024-12-04 DIAGNOSIS — I10 BENIGN ESSENTIAL HTN: ICD-10-CM

## 2024-12-04 NOTE — TELEPHONE ENCOUNTER
Received request via: Pharmacy    Was the patient seen in the last year in this department? Yes    Does the patient have an active prescription (recently filled or refills available) for medication(s) requested? No    Pharmacy Name: [facility]     Does the patient have MCFP Plus and need 100-day supply? (This applies to ALL medications) Patient does not have SCP

## 2024-12-05 RX ORDER — HYDROCHLOROTHIAZIDE 25 MG/1
25 TABLET ORAL DAILY
Qty: 30 TABLET | Refills: 0 | Status: SHIPPED | OUTPATIENT
Start: 2024-12-05

## 2025-01-16 DIAGNOSIS — I10 BENIGN ESSENTIAL HTN: ICD-10-CM

## 2025-01-16 RX ORDER — HYDROCHLOROTHIAZIDE 25 MG/1
25 TABLET ORAL DAILY
Qty: 30 TABLET | Refills: 0 | Status: SHIPPED | OUTPATIENT
Start: 2025-01-16

## 2025-01-16 NOTE — TELEPHONE ENCOUNTER
Received request via: Pharmacy    Was the patient seen in the last year in this department? Yes    Does the patient have an active prescription (recently filled or refills available) for medication(s) requested? No    Pharmacy Name: [facility]     Does the patient have halfway Plus and need 100-day supply? (This applies to ALL medications) Patient does not have SCP

## 2025-01-30 DIAGNOSIS — J45.41 MODERATE PERSISTENT ASTHMA WITH ACUTE EXACERBATION: ICD-10-CM

## 2025-02-03 RX ORDER — FLUTICASONE PROPIONATE 50 MCG
1 SPRAY, SUSPENSION (ML) NASAL DAILY
Qty: 48 ML | Refills: 2 | Status: SHIPPED | OUTPATIENT
Start: 2025-02-03

## 2025-02-12 DIAGNOSIS — I10 BENIGN ESSENTIAL HTN: ICD-10-CM

## 2025-02-12 NOTE — TELEPHONE ENCOUNTER
Received request via: Pharmacy    Was the patient seen in the last year in this department? Yes    Does the patient have an active prescription (recently filled or refills available) for medication(s) requested? No    Pharmacy Name: [address] ([facility] [place] - [address] [facility] [[id number]]     Does the patient have detention Plus and need 100-day supply? (This applies to ALL medications) Patient does not have SCP

## 2025-02-13 RX ORDER — HYDROCHLOROTHIAZIDE 25 MG/1
25 TABLET ORAL DAILY
Qty: 30 TABLET | Refills: 0 | Status: SHIPPED | OUTPATIENT
Start: 2025-02-13

## 2025-03-11 DIAGNOSIS — I10 BENIGN ESSENTIAL HTN: ICD-10-CM

## 2025-03-11 RX ORDER — HYDROCHLOROTHIAZIDE 25 MG/1
25 TABLET ORAL DAILY
Qty: 30 TABLET | Refills: 0 | Status: SHIPPED | OUTPATIENT
Start: 2025-03-11

## 2025-04-17 DIAGNOSIS — I10 BENIGN ESSENTIAL HTN: ICD-10-CM

## 2025-04-17 RX ORDER — HYDROCHLOROTHIAZIDE 25 MG/1
25 TABLET ORAL DAILY
Qty: 30 TABLET | Refills: 0 | Status: SHIPPED | OUTPATIENT
Start: 2025-04-17

## 2025-04-17 NOTE — TELEPHONE ENCOUNTER
Received request via: Pharmacy    Was the patient seen in the last year in this department? Yes    Does the patient have an active prescription (recently filled or refills available) for medication(s) requested? No    Pharmacy Name:  [address] ([facility]), [place] - [address] [facility]     Does the patient have FCI Plus and need 100-day supply? (This applies to ALL medications) Patient does not have SCP

## 2025-05-05 DIAGNOSIS — I10 BENIGN ESSENTIAL HTN: ICD-10-CM

## 2025-05-05 DIAGNOSIS — J45.41 MODERATE PERSISTENT ASTHMA WITH ACUTE EXACERBATION: ICD-10-CM

## 2025-05-05 DIAGNOSIS — R06.2 WHEEZING: ICD-10-CM

## 2025-05-06 RX ORDER — ALBUTEROL SULFATE 90 UG/1
INHALANT RESPIRATORY (INHALATION)
Qty: 9 G | Refills: 0 | Status: SHIPPED | OUTPATIENT
Start: 2025-05-06

## 2025-05-06 RX ORDER — FLUTICASONE PROPIONATE 50 MCG
1 SPRAY, SUSPENSION (ML) NASAL DAILY
Qty: 48 ML | Refills: 2 | Status: SHIPPED | OUTPATIENT
Start: 2025-05-06

## 2025-05-06 RX ORDER — TIOTROPIUM BROMIDE INHALATION SPRAY 3.12 UG/1
SPRAY, METERED RESPIRATORY (INHALATION)
Qty: 4 G | Refills: 2 | Status: SHIPPED | OUTPATIENT
Start: 2025-05-06

## 2025-05-06 RX ORDER — HYDROCHLOROTHIAZIDE 25 MG/1
25 TABLET ORAL DAILY
Qty: 30 TABLET | Refills: 0 | Status: SHIPPED | OUTPATIENT
Start: 2025-05-06

## 2025-06-24 DIAGNOSIS — I10 BENIGN ESSENTIAL HTN: ICD-10-CM

## 2025-06-24 RX ORDER — HYDROCHLOROTHIAZIDE 25 MG/1
25 TABLET ORAL DAILY
Qty: 30 TABLET | Refills: 0 | Status: SHIPPED | OUTPATIENT
Start: 2025-06-24

## 2025-06-24 NOTE — TELEPHONE ENCOUNTER
Received request via: Patient    Was the patient seen in the last year in this department? Yes    Does the patient have an active prescription (recently filled or refills available) for medication(s) requested? No    Pharmacy Name: [facility]    Does the patient have assisted Plus and need 100-day supply? (This applies to ALL medications) Patient does not have SCP

## 2025-07-01 DIAGNOSIS — J45.41 MODERATE PERSISTENT ASTHMA WITH ACUTE EXACERBATION: ICD-10-CM

## 2025-07-01 DIAGNOSIS — I10 BENIGN ESSENTIAL HTN: ICD-10-CM

## 2025-07-01 DIAGNOSIS — R06.2 WHEEZING: ICD-10-CM

## 2025-07-01 RX ORDER — ALBUTEROL SULFATE 90 UG/1
INHALANT RESPIRATORY (INHALATION)
Qty: 9 G | Refills: 0 | Status: SHIPPED | OUTPATIENT
Start: 2025-07-01

## 2025-07-01 RX ORDER — FLUTICASONE PROPIONATE 50 MCG
1 SPRAY, SUSPENSION (ML) NASAL DAILY
Qty: 48 ML | Refills: 2 | Status: SHIPPED | OUTPATIENT
Start: 2025-07-01

## 2025-07-01 RX ORDER — TIOTROPIUM BROMIDE INHALATION SPRAY 3.12 UG/1
SPRAY, METERED RESPIRATORY (INHALATION)
Qty: 4 G | Refills: 2 | Status: SHIPPED | OUTPATIENT
Start: 2025-07-01

## 2025-07-01 RX ORDER — HYDROCHLOROTHIAZIDE 25 MG/1
25 TABLET ORAL DAILY
Qty: 30 TABLET | Refills: 0 | Status: SHIPPED | OUTPATIENT
Start: 2025-07-01

## 2025-07-01 NOTE — TELEPHONE ENCOUNTER
Pt requesting medication to be sent to [place].     Received request via: Patient    Was the patient seen in the last year in this department? Yes    Does the patient have an active prescription (recently filled or refills available) for medication(s) requested? No    Pharmacy Name: [facility]     Does the patient have FPC Plus and need 100-day supply? (This applies to ALL medications) Patient does not have SCP

## 2025-07-08 DIAGNOSIS — J45.41 MODERATE PERSISTENT ASTHMA WITH ACUTE EXACERBATION: ICD-10-CM

## 2025-07-08 RX ORDER — UMECLIDINIUM 62.5 UG/1
1 AEROSOL, POWDER ORAL
COMMUNITY
End: 2025-07-08 | Stop reason: SDUPTHER

## 2025-07-08 RX ORDER — UMECLIDINIUM 62.5 UG/1
1 AEROSOL, POWDER ORAL DAILY
Qty: 7 EACH | Refills: 2 | Status: SHIPPED | OUTPATIENT
Start: 2025-07-08

## 2025-07-08 RX ORDER — TIOTROPIUM BROMIDE INHALATION SPRAY 3.12 UG/1
SPRAY, METERED RESPIRATORY (INHALATION)
Qty: 4 G | Refills: 2 | OUTPATIENT
Start: 2025-07-08

## 2025-07-08 NOTE — TELEPHONE ENCOUNTER
Received request via: Patient    Was the patient seen in the last year in this department? Yes    Does the patient have an active prescription (recently filled or refills available) for medication(s) requested? No    Pharmacy Name: [facility]     Does the patient have MCFP Plus and need 100-day supply? (This applies to ALL medications) Patient does not have SCP

## 2025-08-25 DIAGNOSIS — I10 BENIGN ESSENTIAL HTN: ICD-10-CM

## 2025-08-25 RX ORDER — HYDROCHLOROTHIAZIDE 25 MG/1
25 TABLET ORAL DAILY
Qty: 30 TABLET | Refills: 0 | Status: SHIPPED | OUTPATIENT
Start: 2025-08-25